# Patient Record
Sex: FEMALE | Race: WHITE | Employment: UNEMPLOYED | URBAN - METROPOLITAN AREA
[De-identification: names, ages, dates, MRNs, and addresses within clinical notes are randomized per-mention and may not be internally consistent; named-entity substitution may affect disease eponyms.]

---

## 2017-03-02 RX ORDER — ESCITALOPRAM OXALATE 10 MG/1
TABLET ORAL
Qty: 90 TAB | Refills: 1 | Status: SHIPPED | OUTPATIENT
Start: 2017-03-02 | End: 2017-08-30 | Stop reason: SDUPTHER

## 2017-05-11 ENCOUNTER — OFFICE VISIT (OUTPATIENT)
Dept: OBGYN CLINIC | Age: 42
End: 2017-05-11

## 2017-05-11 VITALS
DIASTOLIC BLOOD PRESSURE: 72 MMHG | WEIGHT: 212.6 LBS | BODY MASS INDEX: 35.42 KG/M2 | RESPIRATION RATE: 18 BRPM | SYSTOLIC BLOOD PRESSURE: 126 MMHG | HEIGHT: 65 IN

## 2017-05-11 DIAGNOSIS — Z01.419 ENCOUNTER FOR GYNECOLOGICAL EXAMINATION (GENERAL) (ROUTINE) WITHOUT ABNORMAL FINDINGS: Primary | ICD-10-CM

## 2017-05-11 DIAGNOSIS — F41.9 ANXIETY: ICD-10-CM

## 2017-05-11 DIAGNOSIS — Z92.0: ICD-10-CM

## 2017-05-11 RX ORDER — ETONOGESTREL AND ETHINYL ESTRADIOL 11.7; 2.7 MG/1; MG/1
INSERT, EXTENDED RELEASE VAGINAL
Qty: 3 DEVICE | Refills: 4 | Status: SHIPPED | OUTPATIENT
Start: 2017-05-11 | End: 2018-05-30 | Stop reason: SDUPTHER

## 2017-05-11 NOTE — PATIENT INSTRUCTIONS

## 2017-05-11 NOTE — PROGRESS NOTES
[facility]  [email or web address]  [phone]    Justyna Kimble MD, [facility]       Annual Gynecologic Exam  [facility] 40-60  Chief Complaint   Patient presents with    Well Woman       Fern Fernandez is a 40 y.o. No obstetric history on file. WHITE OR   female who presents for an annual exam.  Patient's last menstrual period was 03/27/2017 (exact date). .    She does not report additional concerns today. Menstrual status:  Her periods are moderate. She does not report dysmenorrhea/painful menses. She does not report irregular bleeding. Sexual history and Contraception:  History   Sexual Activity    Sexual activity: Yes    Partners: Male    Birth control/ protection: Inserts     She never uses condoms with sexual activity. She does not reports new sexual partner(s) in the last year. The patient does not request STD testing. Preventive Medicine History:  Her last annual GYN exam was about one year ago. Her most recent Pap smear result: normal was obtained in January 2014. Her most recent HR HPV screen was Negative obtained 3 year(s) ago. She does not have a history of KENZIE 2, 3 or cervical cancer. Breast health:  Last mammogram: was normal.   A mammogram was scheduled for today. Breast cancer family updated: see FH. Bone health: Christiano Beasley She does not have a history of osteopenia/osteoporosis. Osteoporosis family history updated: see . Past Medical History:   Diagnosis Date    ADHD (attention deficit hyperactivity disorder) 2008    adult. stopped meds 2015, doing well    Allergy to mold spores     Anxiety 3/2012    first rx age 33 5    Asthma     mild.  Breast nodule 3/2014    R 12:00.  dense tissue. Dr. 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   Dr. Jeffy Montoya Vitamin D deficiency      OB History   Obstetric Comments Menarche:  16. LMP: 1/2/14. # of Children:  0. Age at Delivery of First Child:  n/a. Hysterectomy/oophorectomy:  NO/NO. Breast Bx:  no.  Hx of Breast Feeding:  no. BCP:  yes. Hormone therapy:  no.       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     Social History Main Topics    Smoking status: Never Smoker    Smokeless tobacco: Never Used    Alcohol use Yes      Comment: 3-4 per month    Drug use: No    Sexual activity: Yes     Partners: Male     Birth control/ protection: Inserts     Other Topics Concern    Not on file     Social History Narrative       Allergies   Allergen Reactions    Wellbutrin [Bupropion Hcl] Other (comments)     irritability       Current Outpatient Prescriptions   Medication Sig    ethinyl estradiol-etonogestrel (NUVARING) 0.12-0.015 mg/24 hr vaginal ring Per month: Insert vaginally, leave in for three weeks, remove for one week    escitalopram oxalate (LEXAPRO) 10 mg tablet TAKE 1 TABLET BY MOUTH DAILY.  SELENIUM PO Take  by mouth.  cetirizine (ZYRTEC) 10 mg tablet Take 10 mg by mouth daily.  methimazole (TAPAZOLE) 5 mg tablet Take 1 Tab by mouth daily. (Patient taking differently: Take 5 mg by mouth daily. Pt. Takes 1/2 tab M, W, F)    diphenhydrAMINE (BENADRYL) 25 mg capsule Take 25 mg by mouth every six (6) hours as needed.  mometasone-formoterol (DULERA) 100-5 mcg/actuation HFA inhaler Take 2 Puffs by inhalation two (2) times a day.     albuterol (VENTOLIN HFA) 90 mcg/actuation inhaler Take 2 Puffs by inhalation every six (6) hours as needed for Wheezing.  nystatin-triamcinolone (MYCOLOG II) topical cream Apply  to affected area three (3) times daily as needed for Skin Irritation.  OMEGA-3 FATTY ACIDS/FISH OIL (OMEGA 3 FISH OIL PO) Take 2,400 mg by mouth every other day.  PRENATAL VITS W-CA,FE,FA,<1MG, (PRENATAL VITAMIN PO) Take 1 Tab by mouth daily.  cholecalciferol, VITAMIN D3, (VITAMIN D3) 5,000 unit tab tablet Take 1 Tab by mouth daily. No current facility-administered medications for this visit.         Patient Active Problem List   Diagnosis Code    Anxiety F41.9    Vitamin D deficiency E55.9    ADHD (attention deficit hyperactivity disorder) F90.9    Hyperthyroidism E05.90    Environmental allergies Z91.09    Asthma J45.909    Headache R51    Family history of ovarian cancer Z80.41    Breast lump on right side at 1 o'clock position N63    Large breasts N62       Review of Systems - History obtained from the patient  Constitutional: negative for weight loss, fever, night sweats  HEENT: negative for hearing loss, earache, congestion, snoring, sorethroat  CV: negative for chest pain, palpitations, edema  Resp: negative for cough, shortness of breath, wheezing  GI: negative for change in bowel habits, abdominal pain, black or bloody stools  : negative for frequency, dysuria, hematuria, vaginal discharge  MSK: negative for back pain, joint pain, muscle pain  Breast: negative for breast lumps, nipple discharge, galactorrhea  Skin :negative for itching, rash, hives  Neuro: negative for dizziness, headache, confusion, weakness  Psych: negative for anxiety, depression, change in mood  Heme/lymph: negative for bleeding, bruising, pallor    Physical Exam  Visit Vitals    /72 (BP 1 Location: Left arm, BP Patient Position: Sitting)    Resp 18    Ht 5' 5\" (1.651 m)    Wt 212 lb 9.6 oz (96.4 kg)    LMP 03/27/2017 (Exact Date)  BMI 35.38 kg/m2       Constitutional  · Appearance: well-nourished, well developed, alert, in no acute distress    HENT  · Head and Face: appears normal    Neck  · Inspection/Palpation: normal appearance, no masses or tenderness  · Lymph Nodes: no lymphadenopathy present  · Thyroid: gland size normal, nontender, no nodules or masses present on palpation    Chest  · Respiratory Effort: breathing labored  · Auscultation: normal breath sounds    Cardiovascular  · Heart:  · Auscultation: regular rate and rhythm without murmur    Breasts  · Inspection of Breasts: breasts symmetrical, no skin changes, no discharge present, nipple appearance normal, no skin retraction present  · Palpation of Breasts and Axillae: no masses present on palpation, no breast tenderness  · Axillary Lymph Nodes: no lymphadenopathy present    Gastrointestinal  · Abdominal Examination: abdomen non-tender to palpation, normal bowel sounds, no masses present  · Liver and spleen: no hepatomegaly present, spleen not palpable  · Hernias: no hernias identified    Genitourinary  · External Genitalia: normal appearance for age, no discharge present, no tenderness present, no inflammatory lesions present, no masses present, no atrophy present  · Vagina: normal vaginal vault without central or paravaginal defects, no discharge present, no inflammatory lesions present, no masses present, ring in place  · Bladder: non-tender to palpation  · Urethra: appears normal  · Cervix: normal   · Uterus: normal size, shape and consistency  · Adnexa: no adnexal tenderness present, no adnexal masses present  · Perineum: perineum within normal limits, no evidence of trauma, no rashes or skin lesions present  · Anus: anus within normal limits, no hemorrhoids present  · Inguinal Lymph Nodes: no lymphadenopathy present    Skin  · General Inspection: no rash, no lesions identified    Neurologic/Psychiatric  · Mental Status:  · Orientation: grossly oriented to person, place and time  · Mood and Affect: mood normal, affect appropriate    Assessment:  40 y.o. No obstetric history on file. for well woman exam  Encounter Diagnoses   Name Primary?  Encounter for gynecological examination (general) (routine) without abnormal findings Yes    H/O hormonal contraceptive     Anxiety        Plan:  The patient was counseled about diet, exercise, healthy lifestyle  We discussed current self breast exam and mammogram recommendations  We discussed current pap smear and HR HPV testing guidelines  We recommend follow up in one year for routine annual gynecologic exam or sooner if needed  We recommend follow up with a primary care physician for any chronic medical problems or non-gynecologic concerns    We discussed calcium/vitamin D/weight bearing exercise and osteoporosis prevention  Handouts were given to the patient  Discussed risks, benefits and alternatives of ring/OCP: including but not limited to dvt/pe/mi/cva/ca/gi risks. Folllow up:  [x] return for annual well woman exam in one year or sooner if she is having problems  [] follow up and ultrasound  [x] mammogram  [] 6 months  [] 3 months  [] 1 month    Orders Placed This Encounter    ethinyl estradiol-etonogestrel (NUVARING) 0.12-0.015 mg/24 hr vaginal ring       Results for orders placed or performed in visit on 05/11/17   [facility] MAMMO BI SCREENING INCL CAD    Narrative    STUDY: Bilateral digital screening mammogram    INDICATION:  Screening. COMPARISON:  2014    BREAST COMPOSITION:  There are scattered areas of fibroglandular density. FINDINGS: Bilateral digital screening mammography was performed and is  interpreted in conjunction with a computer assisted detection (CAD) system. No  suspicious masses or calcifications are identified. There has been no  significant change. Impression    IMPRESSION:  BI-RADS 1: Negative. No mammographic evidence of malignancy.     RECOMMENDATIONS:  Next screening mammogram is recommended in one year. The patient will be notified of these results.

## 2017-08-30 RX ORDER — ESCITALOPRAM OXALATE 10 MG/1
TABLET ORAL
Qty: 90 TAB | Refills: 1 | Status: SHIPPED | OUTPATIENT
Start: 2017-08-30 | End: 2018-02-28 | Stop reason: SDUPTHER

## 2017-09-12 ENCOUNTER — TELEPHONE (OUTPATIENT)
Dept: OBGYN CLINIC | Age: 42
End: 2017-09-12

## 2017-09-12 NOTE — TELEPHONE ENCOUNTER
Message left at 2:20PM      44years old patient last seen in the office on 5/11/17. [facility] pharmacy left a message regarding a 90 day prescription for the patients Nuvaring. This nurse called the pharmacy and advised that a prescription for 90 day supply had already been sent on 5/11/17. Pharmacy verbalized understanding.

## 2017-09-18 RX ORDER — ALBUTEROL SULFATE 90 UG/1
2 AEROSOL, METERED RESPIRATORY (INHALATION)
Qty: 3 INHALER | Refills: 4 | Status: SHIPPED | OUTPATIENT
Start: 2017-09-18 | End: 2017-09-21 | Stop reason: ALTCHOICE

## 2017-09-18 NOTE — TELEPHONE ENCOUNTER
Per the pharmacy she needs her  albuterol (VENTOLIN HFA) 90 mcg/actuation inhaler  prn  12/28/15  -- Mirta Morales MD        Take 2 Puffs by inhalation every six (6) hours as needed for Wheezing   Call into the [facility] at [phone]

## 2017-09-21 ENCOUNTER — TELEPHONE (OUTPATIENT)
Dept: INTERNAL MEDICINE CLINIC | Age: 42
End: 2017-09-21

## 2017-09-21 RX ORDER — ALBUTEROL SULFATE 90 UG/1
1 AEROSOL, METERED RESPIRATORY (INHALATION)
Qty: 3 INHALER | Refills: 3 | Status: SHIPPED | OUTPATIENT
Start: 2017-09-21 | End: 2019-03-23 | Stop reason: SDUPTHER

## 2017-09-21 NOTE — TELEPHONE ENCOUNTER
[facility] at [phone] call in advising Dr Rakan Wong had requested a script for the Ventolin inhalers and pts insurance will only cover the ProAir HFA Inhaler so they need a new script for the ProAir HFA 3 inhalers sent over

## 2017-10-24 ENCOUNTER — OFFICE VISIT (OUTPATIENT)
Dept: DERMATOLOGY | Facility: AMBULATORY SURGERY CENTER | Age: 42
End: 2017-10-24

## 2017-10-24 VITALS
DIASTOLIC BLOOD PRESSURE: 82 MMHG | SYSTOLIC BLOOD PRESSURE: 130 MMHG | RESPIRATION RATE: 18 BRPM | HEIGHT: 65 IN | TEMPERATURE: 98.4 F | BODY MASS INDEX: 35.82 KG/M2 | OXYGEN SATURATION: 99 % | WEIGHT: 215 LBS | HEART RATE: 77 BPM

## 2017-10-24 DIAGNOSIS — Z12.83 SCREENING FOR MALIGNANT NEOPLASM OF SKIN: ICD-10-CM

## 2017-10-24 DIAGNOSIS — D22.9 MULTIPLE BENIGN NEVI: Primary | ICD-10-CM

## 2017-10-24 DIAGNOSIS — Z80.8 FAMILY HISTORY OF MELANOMA: ICD-10-CM

## 2017-10-24 DIAGNOSIS — L82.1 SEBORRHEIC KERATOSES: ICD-10-CM

## 2017-10-24 NOTE — MR AVS SNAPSHOT
Visit Information Date & Time Provider Department Dept. Phone Encounter #  
 10/24/2017  8:00 AM Davion Hurley NP [place] [phone] Upcoming Health Maintenance Date Due Pneumococcal 19-64 Medium Risk (1 of 1 - PPSV23) 4/21/1995 INFLUENZA AGE 9 TO ADULT 8/1/2017 PAP AKA CERVICAL CYTOLOGY 1/10/2019 BREAST CANCER SCRN MAMMOGRAM 5/11/2019 DTaP/Tdap/Td series (2 - Td) 2/22/2026 Allergies as of 10/24/2017  Review Complete On: 10/24/2017 By: Christiano Serrano Severity Noted Reaction Type Reactions Wellbutrin [Bupropion Hcl] Low 11/05/2014   Intolerance Other (comments) irritability Current Immunizations  Reviewed on 9/30/2013 Name Date Influenza Vaccine 9/30/2013 Influenza Vaccine (Quad) 11/16/2015  2:05 PM  
 Tdap 2/22/2016 Not reviewed this visit Vitals BP Pulse Temp Resp Height(growth percentile) Weight(growth percentile) 130/82 (BP 1 Location: Right arm, BP Patient Position: Sitting) 77 98.4 °F (36.9 °C) (Oral) 18 5' 5\" (1.651 m) 215 lb (97.5 kg) SpO2 BMI OB Status Smoking Status 99% 35.78 kg/m2 Having regular periods Never Smoker BMI and BSA Data Body Mass Index Body Surface Area 35.78 kg/m 2 2.11 m 2 Preferred Pharmacy [facility] Phone [facility] [id number] [place] [address] [phone] Your Updated Medication List  
  
   
This list is accurate as of: 10/24/17  8:13 AM.  Always use your most recent med list.  
  
  
  
  
 albuterol 90 mcg/actuation inhaler Commonly known as:  PROAIR HFA Take 1 Puff by inhalation every six (6) hours as needed for Wheezing or Shortness of Breath. cholecalciferol (VITAMIN D3) 5,000 unit Tab tablet Commonly known as:  VITAMIN D3 Take 1 Tab by mouth daily. diphenhydrAMINE 25 mg capsule Commonly known as:  BENADRYL Take 25 mg by mouth every six (6) hours as needed. escitalopram oxalate 10 mg tablet Commonly known as:  Brett Heads TAKE 1 TABLET BY MOUTH DAILY. ethinyl estradiol-etonogestrel 0.12-0.015 mg/24 hr vaginal ring Commonly known as:  Sebastian Thomas Per month: Insert vaginally, leave in for three weeks, remove for one week  
  
 methIMAzole 5 mg tablet Commonly known as:  TAPAZOLE Take 1 Tab by mouth daily. mometasone-formoterol 100-5 mcg/actuation HFA inhaler Commonly known as:  Faraz Soles Take 2 Puffs by inhalation two (2) times a day. nystatin-triamcinolone topical cream  
Commonly known as:  MYCOLOG II Apply  to affected area three (3) times daily as needed for Skin Irritation. OMEGA 3 FISH OIL PO Take 2,400 mg by mouth every other day. PRENATAL VITAMIN PO Take 1 Tab by mouth daily. SELENIUM PO Take  by mouth. ZyrTEC 10 mg tablet Generic drug:  cetirizine Take 10 mg by mouth daily. Patient Instructions Self Skin Exam and Sunscreens Early detection and treatment is essential in the treatment of all forms of skin cancer. If caught early, all forms of skin cancer are curable. In addition to your regular visits, you should perform a monthly skin examination. Over time, you become familiar with what is normally found on your skin and can identify new or suspicious spots. One of the screening tools you can use to assess your skin is to follow the ABCDEs: 
 
A= Asymmetry (One half is unlike the other half) B= Border (An irregular, scalloped or poorly defined edge) C= Color (Is varied from one area to another, has shades of tan, brown/ black,       white, red or blue) D= Diameter (Spots larger than 6mm or a pencil eraser) E= Evolving (New spots or one that is changing in size, shape, or color) A follow- up interval will be customized based on your history of skin cancer or level of skin damage and risk factors. In any case, if you notice a suspicious or new spot, an appointment should be arranged between regular visits. Everyone should use sunscreen and sun-safe practices, which is especially important for those with a personal or family history of skin cancer. Suggestions for this include: 1. Use daily moisturizers containing SPF 30 or higher. 2. Wear long sleeve clothing with UPF ratings and a broad-brimmed hat. 3. Apply sunscreen with SPF 30 or higher to all sun exposed areas if you are going to be in the sun. A broad spectrum UVA/ UVB sunscreen is best.  Dont forget to REAPPLY every two hours or more often if swimming or sweating! 4. Avoid outside activities during peak sun hours, especially in the summer (10am- 2pm). 5. DO NOT use tanning beds. Using sunscreen and sun-safe practices can help reduce the likelihood of developing skin cancer or additional skin cancers in those previously diagnosed. . 
 
 
  
Introducing [facility]! Dear Yoselyn Perales: Thank you for requesting a [employer] account. Our records indicate that you have previously registered for a [employer]ood[employer] account but its currently inactive. Please call our [employer] support line at [phone]. Additional Information If you have questions, please visit the Frequently Asked Questions section of the [employer] website at [email or web address] Remember, [employer] is NOT to be used for urgent needs. For medical emergencies, dial 911. Now available from your iPhone and Android! Please provide this summary of care documentation to your next provider. Your primary care clinician is listed as Ritu Lin. If you have any questions after today's visit, please call [phone].

## 2017-10-24 NOTE — PATIENT INSTRUCTIONS
Self Skin Exam and Sunscreens    Early detection and treatment is essential in the treatment of all forms of skin cancer. If caught early, all forms of skin cancer are curable. In addition to your regular visits, you should perform a monthly skin examination. Over time, you become familiar with what is normally found on your skin and can identify new or suspicious spots. One of the screening tools you can use to assess your skin is to follow the ABCDEs:    A= Asymmetry (One half is unlike the other half)     B= Border (An irregular, scalloped or poorly defined edge)    C= Color (Is varied from one area to another, has shades of tan, brown/ black,       white, red or blue)    D= Diameter (Spots larger than 6mm or a pencil eraser)    E= Evolving (New spots or one that is changing in size, shape, or color)    A follow- up interval will be customized based on your history of skin cancer or level of skin damage and risk factors. In any case, if you notice a suspicious or new spot, an appointment should be arranged between regular visits. Everyone should use sunscreen and sun-safe practices, which is especially important for those with a personal or family history of skin cancer. Suggestions for this include:    1. Use daily moisturizers containing SPF 30 or higher. 2. Wear long sleeve clothing with UPF ratings and a broad-brimmed hat. 3. Apply sunscreen with SPF 30 or higher to all sun exposed areas if you are going to be in the sun. A broad spectrum UVA/ UVB sunscreen is best.  Dont forget to REAPPLY every two hours or more often if swimming or sweating! 4. Avoid outside activities during peak sun hours, especially in the summer (10am- 2pm). 5. DO NOT use tanning beds. Using sunscreen and sun-safe practices can help reduce the likelihood of developing skin cancer or additional skin cancers in those previously diagnosed. Lynda Vergara

## 2017-10-24 NOTE — PROGRESS NOTES
Chief Complaint   Patient presents with    Skin Exam     1. Have you been to the ER, urgent care clinic since your last visit? Hospitalized since your last visit? No    2. Have you seen or consulted any other health care providers outside of the [address] [place] [address] since your last visit? Include any pap smears or colon screening. Last November Martita Durham/ [facility].

## 2018-05-30 ENCOUNTER — OFFICE VISIT (OUTPATIENT)
Dept: OBGYN CLINIC | Age: 43
End: 2018-05-30

## 2018-05-30 VITALS
SYSTOLIC BLOOD PRESSURE: 126 MMHG | BODY MASS INDEX: 36.82 KG/M2 | HEIGHT: 65 IN | DIASTOLIC BLOOD PRESSURE: 90 MMHG | WEIGHT: 221 LBS

## 2018-05-30 DIAGNOSIS — Z76.89 ENCOUNTER FOR MENSTRUAL REGULATION: ICD-10-CM

## 2018-05-30 DIAGNOSIS — Z01.419 WELL WOMAN EXAM: Primary | ICD-10-CM

## 2018-05-30 DIAGNOSIS — Z92.0: ICD-10-CM

## 2018-05-30 RX ORDER — ETONOGESTREL AND ETHINYL ESTRADIOL 11.7; 2.7 MG/1; MG/1
INSERT, EXTENDED RELEASE VAGINAL
Qty: 3 DEVICE | Refills: 4 | Status: SHIPPED | OUTPATIENT
Start: 2018-05-30 | End: 2020-08-17 | Stop reason: SDUPTHER

## 2018-05-30 NOTE — MR AVS SNAPSHOT
[address] [place] [address] 
[phone] Patient: El Yeung MRN: [id number] :1975 Visit Information Date & Time Provider Department Dept. Phone Encounter #  
 2018  4:00 PM MD Steve Hines Robert [phone] Your Appointments 2018  8:45 AM  
ROUTINE CARE with Thayne Eisenmenger, MD  
[facility] [address]) Appt Note: med check [address] Dave Babinski Rubina Scales [address]  
  
   
 [address] [id number]  
  
    
 2018  1:20 PM  
MAMMOGRAPHY with MAMMOGRAM, MICHELA Jones ([address]) Appt Note: 2D only  tp pt  
 [address] [facility] [address] [place] [address]  
[phone]  
  
   
 [address]  
  
    
 10/24/2018  8:00 AM  
Office Visit with Augustine Del Castillo NP [place] [address] [place] [address]) Appt Note: 1 year skin exam  
 [facility] [address] [facility] [id number] [place], [place] [id number] [address] [address] Upcoming Health Maintenance Date Due Influenza Age 5 to Adult 2018 PAP AKA CERVICAL CYTOLOGY 1/10/2019 BREAST CANCER SCRN MAMMOGRAM 2019 Allergies as of 2018  Review Complete On: 2018 By: Gilbert Phillips MD  
  
 Severity Noted Reaction Type Reactions Wellbutrin [Bupropion Hcl] Low 2014   Intolerance Other (comments) irritability Current Immunizations  Reviewed on 2013 Name Date Influenza Vaccine 2013 Influenza Vaccine (Quad) 2015  2:05 PM  
 Tdap 2016 Not reviewed this visit You Were Diagnosed With   
  
 Codes Comments Well woman exam    -  Primary ICD-10-CM: P18.554 ICD-9-CM: V72.31   
 H/O hormonal contraceptive     ICD-10-CM: Z92.0 ICD-9-CM: V15.7 Vitals BP Height(growth percentile) Weight(growth percentile) LMP  
 126/90 (BP 1 Location: Left arm, BP Patient Position: Sitting) 5' 5\" (1.651 m) 221 lb (100.2 kg) 04/27/2018 (Within Days) BMI OB Status Smoking Status 36.78 kg/m2 Having regular periods Never Smoker Vitals History BMI and BSA Data Body Mass Index Body Surface Area 36.78 kg/m 2 2.14 m 2 Preferred Pharmacy Pharmacy Name Phone [facility] [address] [place] [address] [phone] Your Updated Medication List  
  
   
This list is accurate as of 5/30/18  5:08 PM.  Always use your most recent med list.  
  
  
  
  
 albuterol 90 mcg/actuation inhaler Commonly known as:  PROAIR HFA Take 1 Puff by inhalation every six (6) hours as needed for Wheezing or Shortness of Breath. cholecalciferol (VITAMIN D3) 5,000 unit Tab tablet Commonly known as:  VITAMIN D3 Take 1 Tab by mouth daily. diphenhydrAMINE 25 mg capsule Commonly known as:  BENADRYL Take 25 mg by mouth every six (6) hours as needed. DULERA 100-5 mcg/actuation HFA inhaler Generic drug:  mometasone-formoterol INHALE 2 PUFFS BY MOUTH TWICE DAILY  
  
 escitalopram oxalate 10 mg tablet Commonly known as:  Lidia Bread TAKE 1 TABLET BY MOUTH DAILY. ethinyl estradiol-etonogestrel 0.12-0.015 mg/24 hr vaginal ring Commonly known as:  Leyla Cooley Per month: Insert vaginally, leave in for three weeks, remove for one week  
  
 methIMAzole 5 mg tablet Commonly known as:  TAPAZOLE Take 1 Tab by mouth daily. multivitamin, tx-iron-ca-min 9 mg iron-400 mcg Tab tablet Commonly known as:  THERA-M w/ IRON Take 1 Tab by mouth daily. nystatin-triamcinolone topical cream  
Commonly known as:  MYCOLOG II Apply  to affected area three (3) times daily as needed for Skin Irritation.   
  
 OMEGA 3 FISH OIL PO  
 Take 2,400 mg by mouth every other day. PRENATAL VITAMIN PO Take 1 Tab by mouth daily. SELENIUM PO Take  by mouth. ZyrTEC 10 mg tablet Generic drug:  cetirizine Take 10 mg by mouth daily. Prescriptions Sent to Pharmacy Refills  
 ethinyl estradiol-etonogestrel (NUVARING) 0.12-0.015 mg/24 hr vaginal ring 4 Sig: Per month: Insert vaginally, leave in for three weeks, remove for one week Class: Normal  
 Pharmacy: [facility] [address] #: [phone] Patient Instructions Well Visit, Ages 25 to 48: Care Instructions Your Care Instructions Physical exams can help you stay healthy. Your doctor has checked your overall health and may have suggested ways to take good care of yourself. He or she also may have recommended tests. At home, you can help prevent illness with healthy eating, regular exercise, and other steps. Follow-up care is a key part of your treatment and safety. Be sure to make and go to all appointments, and call your doctor if you are having problems. It's also a good idea to know your test results and keep a list of the medicines you take. How can you care for yourself at home? · Reach and stay at a healthy weight. This will lower your risk for many problems, such as obesity, diabetes, heart disease, and high blood pressure. · Get at least 30 minutes of physical activity on most days of the week. Walking is a good choice. You also may want to do other activities, such as running, swimming, cycling, or playing tennis or team sports. Discuss any changes in your exercise program with your doctor. · Do not smoke or allow others to smoke around you. If you need help quitting, talk to your doctor about stop-smoking programs and medicines. These can increase your chances of quitting for good.  
· Talk to your doctor about whether you have any risk factors for sexually transmitted infections (STIs). Having one sex partner (who does not have STIs and does not have sex with anyone else) is a good way to avoid these infections. · Use birth control if you do not want to have children at this time. Talk with your doctor about the choices available and what might be best for you. · Protect your skin from too much sun. When you're outdoors from 10 a.m. to 4 p.m., stay in the shade or cover up with clothing and a hat with a wide brim. Wear sunglasses that block UV rays. Even when it's cloudy, put broad-spectrum sunscreen (SPF 30 or higher) on any exposed skin. · See a dentist one or two times a year for checkups and to have your teeth cleaned. · Wear a seat belt in the car. · Drink alcohol in moderation, if at all. That means no more than 2 drinks a day for men and 1 drink a day for women. Follow your doctor's advice about when to have certain tests. These tests can spot problems early. For everyone · Cholesterol. Have the fat (cholesterol) in your blood tested after age 21. Your doctor will tell you how often to have this done based on your age, family history, or other things that can increase your risk for heart disease. · Blood pressure. Have your blood pressure checked during a routine doctor visit. Your doctor will tell you how often to check your blood pressure based on your age, your blood pressure results, and other factors. · Vision. Talk with your doctor about how often to have a glaucoma test. 
· Diabetes. Ask your doctor whether you should have tests for diabetes. · Colon cancer. Have a test for colon cancer at age 48. You may have one of several tests. If you are younger than 48, you may need a test earlier if you have any risk factors. Risk factors include whether you already had a precancerous polyp removed from your colon or whether your parent, brother, sister, or child has had colon cancer. For women · Breast exam and mammogram. Talk to your doctor about when you should have a clinical breast exam and a mammogram. Medical experts differ on whether and how often women under 50 should have these tests. Your doctor can help you decide what is right for you. · Pap test and pelvic exam. Begin Pap tests at age 24. A Pap test is the best way to find cervical cancer. The test often is part of a pelvic exam. Ask how often to have this test. 
· Tests for sexually transmitted infections (STIs). Ask whether you should have tests for STIs. You may be at risk if you have sex with more than one person, especially if your partners do not wear condoms. For men · Tests for sexually transmitted infections (STIs). Ask whether you should have tests for STIs. You may be at risk if you have sex with more than one person, especially if you do not wear a condom. · Testicular cancer exam. Ask your doctor whether you should check your testicles regularly. · Prostate exam. Talk to your doctor about whether you should have a blood test (called a PSA test) for prostate cancer. Experts differ on whether and when men should have this test. Some experts suggest it if you are older than 39 and are -American or have a father or brother who got prostate cancer when he was younger than 72. When should you call for help? Watch closely for changes in your health, and be sure to contact your doctor if you have any problems or symptoms that concern you. Where can you learn more? Go to http://em-yenny.info/. Enter P072 in the search box to learn more about \"Well Visit, Ages 25 to 48: Care Instructions. \" Current as of: May 12, 2017 Content Version: 11.4 © 3706-6221 Healthwise, Incorporated. Care instructions adapted under license by [employer] (which disclaims liability or warranty for this information).  If you have questions about a medical condition or this instruction, always ask your healthcare professional. [address] any warranty or liability for your use of this information. Introducing [facility]! Dear Morris: Thank you for requesting a Intrakr account. Our records indicate that you have previously registered for a Intrakr account but its currently inactive. Please call our Intrakr support line at [phone]. Additional Information If you have questions, please visit the Frequently Asked Questions section of the Intrakr website at [email or web address] Remember, Intrakr is NOT to be used for urgent needs. For medical emergencies, dial 911. Now available from your iPhone and Android! Please provide this summary of care documentation to your next provider. Your primary care clinician is listed as Deirdre Browning. If you have any questions after today's visit, please call [phone].

## 2018-05-30 NOTE — PATIENT INSTRUCTIONS

## 2018-05-30 NOTE — PROGRESS NOTES
[facility]  [email or web address]  [phone]    Arlen Hicks MD, [address] [facility]       Annual Gynecologic Exam  [facility] 40-60  Chief Complaint   Patient presents with    Well Woman       Savanna Ba Rash is a 44 y.o. No obstetric history on file. WHITE OR   female who presents for an annual exam.  Patient's last menstrual period was 04/27/2018 (within days). .    She does not report additional concerns today. Menstrual status:  Her periods are moderate, regular cycles. She does report dysmenorrhea/painful menses. Patient reports significant low back pain and headaches associated with menses. She does not report irregular bleeding. Happy with nuvaring. Sexual history and Contraception:  History   Sexual Activity    Sexual activity: Yes    Partners: Male    Birth control/ protection: Inserts       She does not reports new sexual partner(s) in the last year. The patient does not request STD testing. Preventive Medicine History:  Her most recent Pap smear result: normal was obtained in January 2014    Her most recent HR HPV screen was Negative obtained in 2014    She does not have a history of KENZIE 2, 3 or cervical cancer. Breast health:  Last mammogram: approximate date 5/11/2017 and was normal.   A mammogram was not scheduled for today. Breast cancer family updated: see FH. Bone health: Amy Jones She does not have a history of osteopenia/osteoporosis. Osteoporosis family history updated: see FH. Past Medical History:   Diagnosis Date    ADHD (attention deficit hyperactivity disorder) 2008    adult. stopped meds 2015, doing well    Allergy to mold spores     Anxiety 3/2012    first rx age 33 5    Asthma     mild.  Breast nodule 3/2014    R 12:00.  dense tissue. Dr. 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   Dr. Danial Gustafson H/O mammogram 05/11/2017    Negative    Vitamin D deficiency      OB History   Obstetric Comments   Menarche:  16. LMP: 1/2/14. # of Children:  0. Age at Delivery of First Child:  n/a. Hysterectomy/oophorectomy:  NO/NO. Breast Bx:  no.  Hx of Breast Feeding:  no. BCP:  yes. Hormone therapy:  no.       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     Social History Main Topics    Smoking status: Never Smoker    Smokeless tobacco: Never Used    Alcohol use Yes      Comment: 3-4 per month    Drug use: No    Sexual activity: Yes     Partners: Male     Birth control/ protection: Inserts     Other Topics Concern    Not on file     Social History Narrative       Allergies   Allergen Reactions    Wellbutrin [Bupropion Hcl] Other (comments)     irritability       Current Outpatient Prescriptions   Medication Sig    multivitamin, tx-iron-ca-min (THERA-M W/ IRON) 9 mg iron-400 mcg tab tablet Take 1 Tab by mouth daily.  ethinyl estradiol-etonogestrel (NUVARING) 0.12-0.015 mg/24 hr vaginal ring Per month: Insert vaginally, leave in for three weeks, remove for one week    escitalopram oxalate (LEXAPRO) 10 mg tablet TAKE 1 TABLET BY MOUTH DAILY.  DULERA 100-5 mcg/actuation HFA inhaler INHALE 2 PUFFS BY MOUTH TWICE DAILY    albuterol (PROAIR HFA) 90 mcg/actuation inhaler Take 1 Puff by inhalation every six (6) hours as needed for Wheezing or Shortness of Breath.     diphenhydrAMINE (BENADRYL) 25 mg capsule Take 25 mg by mouth every six (6) hours as needed.  cholecalciferol, VITAMIN D3, (VITAMIN D3) 5,000 unit tab tablet Take 1 Tab by mouth daily.  cetirizine (ZYRTEC) 10 mg tablet Take 10 mg by mouth daily.  methimazole (TAPAZOLE) 5 mg tablet Take 1 Tab by mouth daily. (Patient taking differently: Take 5 mg by mouth daily. Pt. Takes 1/2 tab M, W, F)     No current facility-administered medications for this visit.         Patient Active Problem List   Diagnosis Code    Anxiety F41.9    Vitamin D deficiency E55.9    ADHD (attention deficit hyperactivity disorder) F90.9    Hyperthyroidism E05.90    Environmental allergies Z91.09    Asthma J45.909    Headache(784.0) R51    Family history of ovarian cancer Z80.41    Breast lump on right side at 1 o'clock position N63.12    Large breasts N62       Review of Systems - History obtained from the patient  Constitutional: negative for weight loss, fever, night sweats  HEENT: negative for hearing loss, earache, congestion, snoring, sorethroat  CV: negative for chest pain, palpitations, edema  Resp: negative for cough, shortness of breath, wheezing  GI: negative for change in bowel habits, abdominal pain, black or bloody stools  : negative for frequency, dysuria, hematuria, vaginal discharge  MSK: negative for back pain, joint pain, muscle pain  Breast: negative for breast lumps, nipple discharge, galactorrhea  Skin :negative for itching, rash, hives  Neuro: negative for dizziness, headache, confusion, weakness  Psych: negative for anxiety, depression, change in mood  Heme/lymph: negative for bleeding, bruising, pallor    Physical Exam  Visit Vitals    /90 (BP 1 Location: Left arm, BP Patient Position: Sitting)    Ht 5' 5\" (1.651 m)    Wt 221 lb (100.2 kg)    LMP 04/27/2018 (Within Days)    BMI 36.78 kg/m2       Constitutional  · Appearance: well-nourished, well developed, alert, in no acute distress    HENT  · Head and Face: appears normal    Neck  · Inspection/Palpation: normal appearance, no masses or tenderness  · Lymph Nodes: no lymphadenopathy present  · Thyroid: gland size normal, nontender, no nodules or masses present on palpation    Chest  · Respiratory Effort: breathing unlabored  · Auscultation: normal breath sounds    Cardiovascular  · Heart:  · Auscultation: regular rate and rhythm without murmur    Breasts  · Inspection of Breasts: breasts symmetrical, no skin changes, no discharge present, nipple appearance normal, no skin retraction present  · Palpation of Breasts and Axillae: no masses present on palpation, no breast tenderness  · Axillary Lymph Nodes: no lymphadenopathy present    Gastrointestinal  · Abdominal Examination: abdomen non-tender to palpation, normal bowel sounds, no masses present  · Liver and spleen: no hepatomegaly present, spleen not palpable  · Hernias: no hernias identified    Genitourinary  · External Genitalia: normal appearance for age, no discharge present, no tenderness present, no inflammatory lesions present, no masses present, without atrophy present  · Vagina: normal vaginal vault without central or paravaginal defects, no discharge present, no inflammatory lesions present, no masses present  · Bladder: non-tender to palpation  · Urethra: appears normal  · Cervix: normal   · Uterus: normal size, shape and consistency  · Adnexa: no adnexal tenderness present, no adnexal masses present  · Perineum: perineum within normal limits, no evidence of trauma, no rashes or skin lesions present  · Anus: anus within normal limits, no hemorrhoids present  · Inguinal Lymph Nodes: no lymphadenopathy present    Skin  · General Inspection: no rash, no lesions identified    Neurologic/Psychiatric  · Mental Status:  · Orientation: grossly oriented to person, place and time  · Mood and Affect: mood normal, affect appropriate    Assessment:  44 y.o. No obstetric history on file.  for well woman exam  Encounter Diagnoses Name Primary?  Well woman exam Yes    H/O hormonal contraceptive     Encounter for menstrual regulation        Plan:  The patient was counseled about diet, exercise, healthy lifestyle  We discussed current self breast exam and mammogram recommendations  We discussed current pap smear and HR HPV testing guidelines  We recommend follow up in one year for routine annual gynecologic exam or sooner if needed  We recommend follow up with a primary care physician for any chronic medical problems or non-gynecologic concerns    We discussed calcium/vitamin D/weight bearing exercise and osteoporosis prevention  Handouts were given to the patient    Discussed risks, benefits and alternatives of OCP/nuvaring/patch: including but not limited to dvt/pe/mi/cva/ca/gi risks. Folllow up:  [x] return for annual well woman exam in one year or sooner if she is having problems  [] follow up and ultrasound  [x] mammogram  [] 6 months  [] 3 months  [] 1 month    Orders Placed This Encounter    ethinyl estradiol-etonogestrel (NUVARING) 0.12-0.015 mg/24 hr vaginal ring       No results found for any visits on 05/30/18.

## 2018-06-20 ENCOUNTER — OFFICE VISIT (OUTPATIENT)
Dept: INTERNAL MEDICINE CLINIC | Age: 43
End: 2018-06-20

## 2018-06-20 VITALS
DIASTOLIC BLOOD PRESSURE: 83 MMHG | HEART RATE: 82 BPM | BODY MASS INDEX: 37.49 KG/M2 | HEIGHT: 65 IN | WEIGHT: 225 LBS | OXYGEN SATURATION: 97 % | TEMPERATURE: 98.7 F | RESPIRATION RATE: 18 BRPM | SYSTOLIC BLOOD PRESSURE: 132 MMHG

## 2018-06-20 DIAGNOSIS — R51.9 MORNING HEADACHE: ICD-10-CM

## 2018-06-20 DIAGNOSIS — E66.01 SEVERE OBESITY (BMI 35.0-39.9): ICD-10-CM

## 2018-06-20 DIAGNOSIS — R06.83 SNORING: ICD-10-CM

## 2018-06-20 DIAGNOSIS — F90.2 ATTENTION DEFICIT HYPERACTIVITY DISORDER (ADHD), COMBINED TYPE: Primary | ICD-10-CM

## 2018-06-20 RX ORDER — DEXTROAMPHETAMINE SACCHARATE, AMPHETAMINE ASPARTATE MONOHYDRATE, DEXTROAMPHETAMINE SULFATE AND AMPHETAMINE SULFATE 2.5; 2.5; 2.5; 2.5 MG/1; MG/1; MG/1; MG/1
10 CAPSULE, EXTENDED RELEASE ORAL
Qty: 90 CAP | Refills: 0 | Status: SHIPPED | OUTPATIENT
Start: 2018-06-20 | End: 2019-01-03 | Stop reason: SDUPTHER

## 2018-06-20 NOTE — MR AVS SNAPSHOT
[facility] 
 
 
 [address] [place] [address] 
[phone] Patient: Lashaun Leal MRN: [id number] :1975 Visit Information Date & Time Provider Department Dept. Phone Encounter #  
 2018  8:45 AM Suni Campos MD [facility] [address] [id number] Your Appointments 2018  1:20 PM  
MAMMOGRAPHY with MAMMOGRAM, MICHELA Jones ([address]) Appt Note: 2D only  tp pt  
 [address] [facility] [address] [facility] [address]  
[phone]  
  
   
 [address] [address]  
  
    
 10/24/2018  8:00 AM  
Office Visit with NELLIE Martinez [address] [facility]) Appt Note: 1 year skin exam  
 [facility] [address]  
[facility] [address] [facility] [address] Upcoming Health Maintenance Date Due Pneumococcal 19-64 Medium Risk (1 of 1 - PPSV23) 1995 Influenza Age 5 to Adult 2018 PAP AKA CERVICAL CYTOLOGY 1/10/2019 BREAST CANCER SCRN MAMMOGRAM 2019 DTaP/Tdap/Td series (2 - Td) 2026 Allergies as of 2018  Review Complete On: 2018 By: Suni Campos MD  
  
 Severity Noted Reaction Type Reactions Wellbutrin [Bupropion Hcl] Low 2014   Intolerance Other (comments) irritability Current Immunizations  Reviewed on 2013 Name Date Influenza Vaccine 2013 Influenza Vaccine (Quad) 2015  2:05 PM  
 Tdap 2016 Not reviewed this visit You Were Diagnosed With   
  
 Codes Comments Attention deficit hyperactivity disorder (ADHD), combined type    -  Primary ICD-10-CM: F90.2 ICD-9-CM: 314.01 Snoring     ICD-10-CM: R06.83 
ICD-9-CM: 786.09  Severe obesity (BMI 35.0-39.9) (HCC)     ICD-10-CM: E66.01 
ICD-9-CM: 278.01   
 Morning headache     ICD-10-CM: R51 ICD-9-CM: 103. 0 Vitals BP Pulse Temp Resp Height(growth percentile) Weight(growth percentile) 132/83 (BP 1 Location: Left arm, BP Patient Position: Sitting) 82 98.7 °F (37.1 °C) (Oral) 18 5' 5\" (1.651 m) 225 lb (102.1 kg) LMP SpO2 BMI OB Status Smoking Status 05/27/2018 (Within Days) 97% 37.44 kg/m2 Having regular periods Never Smoker Vitals History BMI and BSA Data Body Mass Index Body Surface Area  
 37.44 kg/m 2 2.16 m 2 Preferred [facility]acy [facility] [address] [place] [address] [phone] Your Updated Medication List  
  
   
This list is accurate as of 6/20/18  9:43 AM.  Always use your most recent med list.  
  
  
  
  
 albuterol 90 mcg/actuation inhaler Commonly known as:  PROAIR HFA Take 1 Puff by inhalation every six (6) hours as needed for Wheezing or Shortness of Breath. amphetamine-dextroamphetamine XR 10 mg XR capsule Commonly known as:  ADDERALL XR Take 1 Cap (10 mg total) by mouth every morning. 90 day supply. cholecalciferol (VITAMIN D3) 5,000 unit Tab tablet Commonly known as:  VITAMIN D3 Take 1 Tab by mouth daily. diphenhydrAMINE 25 mg capsule Commonly known as:  BENADRYL Take 25 mg by mouth every six (6) hours as needed. escitalopram oxalate 10 mg tablet Commonly known as:  Skylar Li TAKE 1 TABLET BY MOUTH DAILY. ethinyl estradiol-etonogestrel 0.12-0.015 mg/24 hr vaginal ring Commonly known as:  Zehra Chain Per month: Insert vaginally, leave in for three weeks, remove for one week  
  
 methIMAzole 5 mg tablet Commonly known as:  TAPAZOLE Take 1 Tab by mouth daily. multivitamin, tx-iron-ca-min 9 mg iron-400 mcg Tab tablet Commonly known as:  THERA-M w/ IRON Take 1 Tab by mouth daily. ZyrTEC 10 mg tablet Generic drug:  cetirizine Take 10 mg by mouth daily. Prescriptions Printed Refills  
 amphetamine-dextroamphetamine XR (ADDERALL XR) 10 mg XR capsule 0 Sig: Take 1 Cap (10 mg total) by mouth every morning. 90 day supply. Class: Print Route: Oral  
  
We Performed the Following REFERRAL TO SLEEP STUDIES [REF99 Custom] Referral Information Referral ID Referred By Referred To  
  
 [id number] Cesar YEAGER [facility] [address] [place] [address] Genny Denson 33 Visits Status Start Date End Date 1 New Request 6/20/18 6/20/19 If your referral has a status of pending review or denied, additional information will be sent to support the outcome of this decision. Introducing [facility] & HEALTH SERVICES! [employer] introduces Tocagen patient portal. Now you can access parts of your medical record, email your doctor's office, and request medication refills online. 1. In your internet browser, go to [email or web address] 2. Click on the First Time User? Click Here link in the Sign In box. You will see the New Member Sign Up page. 3. Enter your Tocagen Access Code exactly as it appears below. You will not need to use this code after youve completed the sign-up process. If you do not sign up before the expiration date, you must request a new code. · Tocagen Access Code: [facility] Expires: 8/28/2018  5:08 PM 
 
4. Enter the last four digits of your Social Security Number (xxxx) and Date of Birth (mm/dd/yyyy) as indicated and click Submit. You will be taken to the next sign-up page. 5. Create a Shobutt Babiest ID. This will be your Tocagen login ID and cannot be changed, so think of one that is secure and easy to remember. 6. Create a Shobutt Babiest password. You can change your password at any time. 7. Enter your Password Reset Question and Answer. This can be used at a later time if you forget your password. 8. Enter your e-mail address.  You will receive e-mail notification when new information is available in Get Together. 9. Click Sign Up. You can now view and download portions of your medical record. 10. Click the Download Summary menu link to download a portable copy of your medical information. If you have questions, please visit the Frequently Asked Questions section of the Get Together website. Remember, Get Together is NOT to be used for urgent needs. For medical emergencies, dial 911. Now available from your iPhone and Android! Please provide this summary of care documentation to your next provider. Your primary care clinician is listed as Cintia Saldana. If you have any questions after today's visit, please call [phone].

## 2018-06-20 NOTE — PROGRESS NOTES
HISTORY OF PRESENT ILLNESS    Chief Complaint   Patient presents with    Medication Refill    Medication Evaluation     Requesting Adderal       Presents for follow-up    Seeing endo for thyroid. Decreased methimazole. Reports heavy snoring. Waking snoring. Has AM headaches at times. Reports fatigue some, decreased concentration.  has WAGNER, sees Dr. Melania Lopez. ADD- stopped adderall XR 10 mg daily in 5/2015 and was doing well until the past few months. Feels scattered in thought process. Anxiety-   She weaned off lexapro spring 2014.  She re-started it again 9/2014. Trial of wellbutrin 7/2014 caused her to be more irritable so she stopped it 9/2014.     issues w lower motivation, occasional irritability at times are stable  She took zoloft, paxil in the past and they both caused lethargy and lower libido    In the past, saw Manfred Encinas, [facility] for counseling.      Review of Systems   All other systems reviewed and are negative, except as noted in HPI    Past Medical and Surgical History   has a past medical history of ADHD (attention deficit hyperactivity disorder) (2008); Allergy to mold spores; Anxiety (3/2012); Asthma; Breast nodule (3/2014); Environmental allergies; Family history of skin cancer; Graves disease (2012); H/O mammogram (05/11/2017); and Vitamin D deficiency. She also has no past medical history of Arsenic suspected exposure; Radiation exposure; Skin cancer; Sun-damaged skin; Sunburn, blistering; or Tanning bed exposure. has a past surgical history that includes hx knee arthroscopy (2003). reports that she has never smoked. She has never used smokeless tobacco. She reports that she drinks alcohol. She reports that she does not use illicit drugs. family history includes COPD in her mother; Cancer in her mother; Diabetes in her father and mother; Hypertension in her father; Stroke in her father; Thyroid Disease in her sister.     Physical Exam   Nursing note and vitals reviewed. Blood pressure 132/83, pulse 82, temperature 98.7 °F (37.1 °C), temperature source Oral, resp. rate 18, height 5' 5\" (1.651 m), weight 225 lb (102.1 kg), last menstrual period 05/27/2018, SpO2 97 %. Constitutional:  No distress. Eyes: Conjunctivae are normal.   Ears:  Hearing grossly intact  Cardiovascular: Normal rate. regular rhythm, no murmurs or gallops  No edema  Pulmonary/Chest: Effort normal.   CTAB  Musculoskeletal: moves all 4 extremities   Neurological: Alert and oriented to person, place, and time. Skin: No rash noted. Psychiatric: Normal mood and affect. Behavior is normal.   Mildly anxious, pressured    ASSESSMENT and PLAN  Diagnoses and all orders for this visit:    1. Attention deficit hyperactivity disorder (ADHD), combined type  Uncontrolled off meds for 5 years  Resume past dose  Sleep apnea if dx could contribute to s/s  Anxiety borderline controlled on lexapro  -     amphetamine-dextroamphetamine XR (ADDERALL XR) 10 mg XR capsule; Take 1 Cap (10 mg total) by mouth every morning. 90 day supply. 2. Snoring  3. Severe obesity (BMI 35.0-39.9) ([facility] [address]  4. Morning headache  Evaluate for WAGNER evaluation  -     REFERRAL TO SLEEP STUDIES    lab results and schedule of future lab studies reviewed with patient  reviewed medications and side effects in detail  Return to clinic for further evaluation if new symptoms develop      Current Outpatient Prescriptions   Medication Sig    multivitamin, tx-iron-ca-min (THERA-M W/ IRON) 9 mg iron-400 mcg tab tablet Take 1 Tab by mouth daily.  ethinyl estradiol-etonogestrel (NUVARING) 0.12-0.015 mg/24 hr vaginal ring Per month: Insert vaginally, leave in for three weeks, remove for one week    escitalopram oxalate (LEXAPRO) 10 mg tablet TAKE 1 TABLET BY MOUTH DAILY.  albuterol (PROAIR HFA) 90 mcg/actuation inhaler Take 1 Puff by inhalation every six (6) hours as needed for Wheezing or Shortness of Breath.     diphenhydrAMINE (BENADRYL) 25 mg capsule Take 25 mg by mouth every six (6) hours as needed.  cholecalciferol, VITAMIN D3, (VITAMIN D3) 5,000 unit tab tablet Take 1 Tab by mouth daily.  cetirizine (ZYRTEC) 10 mg tablet Take 10 mg by mouth daily.  methimazole (TAPAZOLE) 5 mg tablet Take 1 Tab by mouth daily. (Patient taking differently: Take 2.5 mg by mouth daily. Daily)    DULERA 100-5 mcg/actuation HFA inhaler INHALE 2 PUFFS BY MOUTH TWICE DAILY     No current facility-administered medications for this visit.

## 2018-07-31 ENCOUNTER — APPOINTMENT (OUTPATIENT)
Dept: OBGYN CLINIC | Age: 43
End: 2018-07-31

## 2018-08-21 RX ORDER — ESCITALOPRAM OXALATE 10 MG/1
TABLET ORAL
Qty: 90 TAB | Refills: 1 | Status: SHIPPED | OUTPATIENT
Start: 2018-08-21 | End: 2019-02-19 | Stop reason: SDUPTHER

## 2018-09-20 ENCOUNTER — OFFICE VISIT (OUTPATIENT)
Dept: INTERNAL MEDICINE CLINIC | Age: 43
End: 2018-09-20

## 2018-09-20 VITALS
TEMPERATURE: 98 F | OXYGEN SATURATION: 97 % | WEIGHT: 226 LBS | HEIGHT: 65 IN | DIASTOLIC BLOOD PRESSURE: 80 MMHG | HEART RATE: 77 BPM | BODY MASS INDEX: 37.65 KG/M2 | RESPIRATION RATE: 18 BRPM | SYSTOLIC BLOOD PRESSURE: 116 MMHG

## 2018-09-20 DIAGNOSIS — F90.2 ATTENTION DEFICIT HYPERACTIVITY DISORDER (ADHD), COMBINED TYPE: Primary | ICD-10-CM

## 2018-09-20 DIAGNOSIS — E05.90 HYPERTHYROIDISM: ICD-10-CM

## 2018-09-20 DIAGNOSIS — Z23 ENCOUNTER FOR IMMUNIZATION: ICD-10-CM

## 2018-09-20 RX ORDER — DEXTROAMPHETAMINE SACCHARATE, AMPHETAMINE ASPARTATE MONOHYDRATE, DEXTROAMPHETAMINE SULFATE AND AMPHETAMINE SULFATE 2.5; 2.5; 2.5; 2.5 MG/1; MG/1; MG/1; MG/1
10 CAPSULE, EXTENDED RELEASE ORAL
Qty: 90 CAP | Refills: 0 | Status: CANCELLED | OUTPATIENT
Start: 2018-09-20

## 2018-09-20 NOTE — PROGRESS NOTES
HISTORY OF PRESENT ILLNESS    Chief Complaint   Patient presents with    Behavioral Problem     medication f/u       Presents for follow-up    ADD - she filled, but did not start adderall since she was concerned that it would affect her sleep study. Still reports ADD s/s. Taking lexapro 10 mg. It has been stressful since parent in [place] w latesha    Saw Dr. Tsering Arce for home sleep study and was dx w mild WAGNER. Referred for oral appliance. She is concerned about weight. Is working [employer] Readings from Last 3 Encounters:   09/20/18 226 lb (102.5 kg)   06/20/18 225 lb (102.1 kg)   05/30/18 221 lb (100.2 kg)     Seeing Dr. Hector Noel for thyroid. appt in the past 6 months. Review of Systems   All other systems reviewed and are negative, except as noted in HPI    Past Medical and Surgical History   has a past medical history of ADHD (attention deficit hyperactivity disorder) (2008); Allergy to mold spores; Anxiety (3/2012); Asthma; Breast nodule (3/2014); Environmental allergies; Family history of skin cancer; Graves disease (2012); H/O mammogram (05/11/2017; 7/31/18); WAGNER (obstructive sleep apnea); and Vitamin D deficiency. She also has no past medical history of Arsenic suspected exposure; Radiation exposure; Skin cancer; Sun-damaged skin; Sunburn, blistering; or Tanning bed exposure. has a past surgical history that includes hx knee arthroscopy (2003). reports that she has never smoked. She has never used smokeless tobacco. She reports that she drinks alcohol. She reports that she does not use illicit drugs. family history includes COPD in her mother; Cancer in her mother; Diabetes in her father and mother; Hypertension in her father; Stroke in her father; Thyroid Disease in her sister. Physical Exam   Nursing note and vitals reviewed. Blood pressure 116/80, pulse 77, temperature 98 °F (36.7 °C), temperature source Oral, resp.  rate 18, height 5' 5\" (1.651 m), weight 226 lb (102.5 kg), last menstrual period 09/14/2018, SpO2 97 %. Constitutional:  No distress. Eyes: Conjunctivae are normal.   Ears:  Hearing grossly intact  Cardiovascular: Normal rate. regular rhythm, no murmurs or gallops  No edema  Pulmonary/Chest: Effort normal.   CTAB  Musculoskeletal: moves all 4 extremities   Neurological: Alert and oriented to person, place, and time. Skin: No rash noted. Psychiatric: Normal mood and affect. Behavior is normal.     ASSESSMENT and PLAN  Diagnoses and all orders for this visit:    1. Attention deficit hyperactivity disorder (ADHD), combined type- uncontrolled. Try adderall XR  -     amphetamine-dextroamphetamine XR (ADDERALL XR) 10 mg XR capsule; Take 1 Cap (10 mg total) by mouth every morning. 90 day supply. 2. Hyperthyroidism- controlled per endo    3. Encounter for immunization- asthma.    -     Pneumococcal polysaccharide vaccine, 23-valent, adult or immunosuppressed pt dose  -     OH IMMUNIZ ADMIN,1 SINGLE/COMB VAC/TOXO    lab results and schedule of future lab studies reviewed with patient  reviewed medications and side effects in detail  Return to clinic for further evaluation if new symptoms develop    Current Outpatient Prescriptions   Medication Sig    escitalopram oxalate (LEXAPRO) 10 mg tablet TAKE 1 TABLET BY MOUTH DAILY.  amphetamine-dextroamphetamine XR (ADDERALL XR) 10 mg XR capsule Take 1 Cap (10 mg total) by mouth every morning. 90 day supply.  multivitamin, tx-iron-ca-min (THERA-M W/ IRON) 9 mg iron-400 mcg tab tablet Take 1 Tab by mouth daily.  ethinyl estradiol-etonogestrel (NUVARING) 0.12-0.015 mg/24 hr vaginal ring Per month: Insert vaginally, leave in for three weeks, remove for one week    albuterol (PROAIR HFA) 90 mcg/actuation inhaler Take 1 Puff by inhalation every six (6) hours as needed for Wheezing or Shortness of Breath.     diphenhydrAMINE (BENADRYL) 25 mg capsule Take 25 mg by mouth every six (6) hours as needed.  cholecalciferol, VITAMIN D3, (VITAMIN D3) 5,000 unit tab tablet Take 1 Tab by mouth daily.  cetirizine (ZYRTEC) 10 mg tablet Take 10 mg by mouth daily.  methimazole (TAPAZOLE) 5 mg tablet Take 1 Tab by mouth daily. (Patient taking differently: Take 2.5 mg by mouth daily. Daily)     No current facility-administered medications for this visit.

## 2018-09-20 NOTE — MR AVS SNAPSHOT
[address] [facility] 
 
 
 [address] 
[phone] Patient: Xavier Alcala MRN: [id number] :1975 Visit Information Date & Time Provider Department Dept. Phone Encounter #  
 2018  8:15 AM Rk Rodríguez MD [facility] [address] [facility] [id number] Your Appointments 10/24/2018  8:00 AM  
Office Visit with NELLIE Lin [facility] [facility]: 1 year skin exam  
 [facility] [address] [facility] [address]  
[address] [facility] [address] [facility] [place] [address] Upcoming Health Maintenance Date Due Pneumococcal 19-64 Medium Risk (1 of 1 - PPSV23) 1995 PAP AKA CERVICAL CYTOLOGY 1/10/2019 Influenza Age 5 to Adult 3/29/2019* BREAST CANCER SCRN MAMMOGRAM 2020 DTaP/Tdap/Td series (2 - Td) 2026 *Topic was postponed. The date shown is not the original due date. Allergies as of 2018  Review Complete On: 2018 By: Rk Rodríguez MD  
  
 Severity Noted Reaction Type Reactions Wellbutrin [Bupropion Hcl] Low 2014   Intolerance Other (comments) irritability Current Immunizations  Reviewed on 2018 Name Date Influenza Vaccine 2013 Influenza Vaccine (Quad) 2015  2:05 PM  
 Pneumococcal Polysaccharide (PPSV-23)  Incomplete Tdap 2016 Reviewed by Rk Rodríguez MD on 2018 at  8:42 AM  
 Reviewed by Rk Rodríguez MD on 2018 at  8:42 AM  
You Were Diagnosed With   
  
 Codes Comments Attention deficit hyperactivity disorder (ADHD), combined type    -  Primary ICD-10-CM: F90.2 ICD-9-CM: 314.01 Hyperthyroidism     ICD-10-CM: E05.90 ICD-9-CM: 242.90 Encounter for immunization     ICD-10-CM: J02 ICD-9-CM: V03.89 Vitals BP Pulse Temp Resp Height(growth percentile) Weight(growth percentile) 116/80 (BP 1 Location: Left arm, BP Patient Position: Sitting) 77 98 °F (36.7 °C) (Oral) 18 5' 5\" (1.651 m) 226 lb (102.5 kg) LMP SpO2 BMI OB Status Smoking Status 09/14/2018 97% 37.61 kg/m2 Having regular periods Never Smoker Vitals History BMI and BSA Data Body Mass Index Body Surface Area  
 37.61 kg/m 2 2.17 m 2 Preferred Pharmacy [facility] [address] [place] [address] [phone] Your Updated Medication List  
  
   
This list is accurate as of 9/20/18  8:47 AM.  Always use your most recent med list.  
  
  
  
  
 albuterol 90 mcg/actuation inhaler Commonly known as:  PROAIR HFA Take 1 Puff by inhalation every six (6) hours as needed for Wheezing or Shortness of Breath. amphetamine-dextroamphetamine XR 10 mg XR capsule Commonly known as:  ADDERALL XR Take 1 Cap (10 mg total) by mouth every morning. 90 day supply. cholecalciferol (VITAMIN D3) 5,000 unit Tab tablet Commonly known as:  VITAMIN D3 Take 1 Tab by mouth daily. diphenhydrAMINE 25 mg capsule Commonly known as:  BENADRYL Take 25 mg by mouth every six (6) hours as needed. escitalopram oxalate 10 mg tablet Commonly known as:  [place] Leriche TAKE 1 TABLET BY MOUTH DAILY. ethinyl estradiol-etonogestrel 0.12-0.015 mg/24 hr vaginal ring Commonly known as:  Unruly Combs Per month: Insert vaginally, leave in for three weeks, remove for one week  
  
 methIMAzole 5 mg tablet Commonly known as:  TAPAZOLE Take 1 Tab by mouth daily. multivitamin, tx-iron-ca-min 9 mg iron-400 mcg Tab tablet Commonly known as:  THERA-M w/ IRON Take 1 Tab by mouth daily. ZyrTEC 10 mg tablet Generic drug:  cetirizine Take 10 mg by mouth daily. We Performed the Following PNEUMOCOCCAL POLYSACCHARIDE VACCINE, 23-VALENT, ADULT OR IMMUNOSUPPRESSED PT DOSE, [09397 CPT(R)] TX IMMUNIZ ADMIN,1 SINGLE/COMB VAC/TOXOID [id number] CPT(R)] Introducing [facility] & HEALTH SERVICES! [employer] introduces Worldrat patient portal. Now you can access parts of your medical record, email your doctor's office, and request medication refills online. 1. In your internet browser, go to [email or web address] 2. Click on the First Time User? Click Here link in the Sign In box. You will see the New Member Sign Up page. 3. Enter your Worldrat Access Code exactly as it appears below. You will not need to use this code after youve completed the sign-up process. If you do not sign up before the expiration date, you must request a new code. · Worldrat Access Code: [email or web address] Expires: 12/19/2018  8:46 AM 
 
4. Enter the last four digits of your Social Security Number (xxxx) and Date of Birth (mm/dd/yyyy) as indicated and click Submit. You will be taken to the next sign-up page. 5. Create a Worldrat ID. This will be your Worldrat login ID and cannot be changed, so think of one that is secure and easy to remember. 6. Create a Worldrat password. You can change your password at any time. 7. Enter your Password Reset Question and Answer. This can be used at a later time if you forget your password. 8. Enter your e-mail address. You will receive e-mail notification when new information is available in [address] 9. Click Sign Up. You can now view and download portions of your medical record. 10. Click the Download Summary menu link to download a portable copy of your medical information. If you have questions, please visit the Frequently Asked Questions section of the Worldrat website. Remember, Worldrat is NOT to be used for urgent needs. For medical emergencies, dial 911. Now available from your iPhone and Android! Please provide this summary of care documentation to your next provider. Your primary care clinician is listed as Stacy Cheema.  If you have any questions after today's visit, please call [phone].

## 2018-09-27 ENCOUNTER — OFFICE VISIT (OUTPATIENT)
Dept: INTERNAL MEDICINE CLINIC | Age: 43
End: 2018-09-27

## 2018-09-27 VITALS
RESPIRATION RATE: 12 BRPM | DIASTOLIC BLOOD PRESSURE: 79 MMHG | HEIGHT: 65 IN | HEART RATE: 82 BPM | WEIGHT: 229 LBS | TEMPERATURE: 99.3 F | OXYGEN SATURATION: 99 % | BODY MASS INDEX: 38.15 KG/M2 | SYSTOLIC BLOOD PRESSURE: 120 MMHG

## 2018-09-27 DIAGNOSIS — E05.90 HYPERTHYROIDISM: ICD-10-CM

## 2018-09-27 DIAGNOSIS — R73.03 PREDIABETES: ICD-10-CM

## 2018-09-27 DIAGNOSIS — E66.01 SEVERE OBESITY (BMI 35.0-39.9): ICD-10-CM

## 2018-09-27 DIAGNOSIS — E55.9 VITAMIN D DEFICIENCY: ICD-10-CM

## 2018-09-27 DIAGNOSIS — Z00.00 WELL WOMAN EXAM (NO GYNECOLOGICAL EXAM): Primary | ICD-10-CM

## 2018-09-27 NOTE — MR AVS SNAPSHOT
Huong Madera 
 
 
 [address] [place] [address] [facility] 
[phone] Patient: Gopal Randle MRN: [id number] :1975 Visit Information Date & Time Provider Department Dept. Phone Encounter #  
 2018  3:20 PM Justice Serrato NP [facility] [address]  [id number] Your Appointments 10/24/2018  8:00 AM  
Office Visit with NELLIE Pablo [address] [facility] Appt Note: 1 year skin exam  
 [facility] [address] [facility] [address]  
[address] [facility] [address] [facility] [address] Upcoming Health Maintenance Date Due  
 PAP AKA CERVICAL CYTOLOGY 1/10/2019 Influenza Age 5 to Adult 3/29/2019* BREAST CANCER SCRN MAMMOGRAM 2020 DTaP/Tdap/Td series (2 - Td) 2026 *Topic was postponed. The date shown is not the original due date. Allergies as of 2018  Review Complete On: 2018 By: Justice Serrato NP Severity Noted Reaction Type Reactions Wellbutrin [Bupropion Hcl] Low 2014   Intolerance Other (comments) irritability Current Immunizations  Reviewed on 2018 Name Date Influenza Vaccine 2013 Influenza Vaccine (Quad) 2015  2:05 PM  
 Pneumococcal Polysaccharide (PPSV-23) 2018 Tdap 2016 Reviewed by Justice Serrato NP on 2018 at  3:56 PM  
You Were Diagnosed With   
  
 Codes Comments Well woman exam (no gynecological exam)    -  Primary ICD-10-CM: Z00.00 ICD-9-CM: V70.0 Hyperthyroidism     ICD-10-CM: E05.90 ICD-9-CM: 242.90 Vitamin D deficiency     ICD-10-CM: E55.9 ICD-9-CM: 268.9 Prediabetes     ICD-10-CM: R73.03 
ICD-9-CM: 790.29 Vitals BP Pulse Temp Resp Height(growth percentile) Weight(growth percentile)  120/79 (BP 1 Location: Left arm, BP Patient Position: Sitting) 82 99.3 °F (37.4 °C) (Oral) 12 5' 5\" (1.651 m) 229 lb (103.9 kg) LMP SpO2 BMI OB Status Smoking Status 09/14/2018 99% 38.11 kg/m2 Having regular periods Never Smoker BMI and BSA Data Body Mass Index Body Surface Area  
 38.11 kg/m 2 2.18 m 2 Preferred Pharmacy Pharmacy Name Phone [facility] [address] [place] [address] [phone] Your Updated Medication List  
  
   
This list is accurate as of 9/27/18  4:15 PM.  Always use your most recent med list.  
  
  
  
  
 albuterol 90 mcg/actuation inhaler Commonly known as:  PROAIR HFA Take 1 Puff by inhalation every six (6) hours as needed for Wheezing or Shortness of Breath. amphetamine-dextroamphetamine XR 10 mg XR capsule Commonly known as:  ADDERALL XR Take 1 Cap (10 mg total) by mouth every morning. 90 day supply. cholecalciferol (VITAMIN D3) 5,000 unit Tab tablet Commonly known as:  VITAMIN D3 Take 1 Tab by mouth daily. diphenhydrAMINE 25 mg capsule Commonly known as:  BENADRYL Take 25 mg by mouth every six (6) hours as needed. escitalopram oxalate 10 mg tablet Commonly known as:  Agustín Tia TAKE 1 TABLET BY MOUTH DAILY. ethinyl estradiol-etonogestrel 0.12-0.015 mg/24 hr vaginal ring Commonly known as:  Tobias Mcnally Per month: Insert vaginally, leave in for three weeks, remove for one week  
  
 methIMAzole 5 mg tablet Commonly known as:  TAPAZOLE Take 1 Tab by mouth daily. multivitamin, tx-iron-ca-min 9 mg iron-400 mcg Tab tablet Commonly known as:  THERA-M w/ IRON Take 1 Tab by mouth daily. ZyrTEC 10 mg tablet Generic drug:  cetirizine Take 10 mg by mouth daily. We Performed the Following CBC WITH AUTOMATED DIFF [69509 CPT(R)] HEMOGLOBIN A1C WITH EAG [81189 CPT(R)] LIPID PANEL [41829 CPT(R)] METABOLIC PANEL, COMPREHENSIVE [61320 CPT(R)] URINALYSIS W/ RFLX MICROSCOPIC [94229 CPT(R)] VITAMIN D, 25 HYDROXY [id number] CPT(R)] Patient Instructions Learning About Vitamin D Why is it important to get enough vitamin D? Your body needs vitamin D to absorb calcium. Calcium keeps your bones and muscles, including your heart, healthy and strong. If your muscles don't get enough calcium, they can cramp, hurt, or feel weak. You may have long-term (chronic) muscle aches and pains. If you don't get enough vitamin D throughout life, you have an increased chance of having thin and brittle bones (osteoporosis) in your later years. Children who don't get enough vitamin D may not grow as much as others their age. They also have a chance of getting a rare disease called rickets. It causes weak bones. Vitamin D and calcium are added to many foods. And your body uses sunshine to make its own vitamin D. How much vitamin D do you need? The [employer] recommends that people ages 3 through 79 get 600 IU (international units) every day. Adults 71 and older need 800 IU every day. Blood tests for vitamin D can check your vitamin D level. But there is no standard normal range used by all laboratories. The [employer] of [employer] recommends a blood level of 20 ng/mL of vitamin D for healthy bones. And most people in the [place] and [facility]) meet this goal. 
How can you get more vitamin D? Foods that contain vitamin D include: 
· Hillister, tuna, and mackerel. These are some of the best foods to eat when you need to get more vitamin D. 
· Cheese, egg yolks, and beef liver. These foods have vitamin D in small amounts. · Milk, soy drinks, orange juice, yogurt, margarine, and some kinds of cereal have vitamin D added to them. Some people don't make vitamin D as well as others. They may have to take extra care in getting enough vitamin D. Things that reduce how much vitamin D your body makes include: · Dark skin, such as many  Americans have. · Age, especially if you are older than 72. · Digestive problems, such as Crohn's or celiac disease. · Liver and kidney disease. Some people who do not get enough vitamin D may need supplements. Are there any risks from taking vitamin D? 
· Too much vitamin D: 
¨ Can damage your kidneys. ¨ Can cause nausea and vomiting, constipation, and weakness. ¨ Raises the amount of calcium in your blood. If this happens, you can get confused or have an irregular heart rhythm. · Vitamin D may interact with other medicines. Tell your doctor about all of the medicines you take, including over-the-counter drugs, herbs, and pills. Tell your doctor about all of your current medical problems. Where can you learn more? Go to http://em-yenny.info/. Enter [id number] in the search box to learn more about \"Learning About Vitamin D.\" 
Current as of: May 12, 2017 Content Version: 11.7 © 9574-5990 [employer]ov[employer]. Care instructions adapted under license by [employer] (which disclaims liability or warranty for this information). If you have questions about a medical condition or this instruction, always ask your healthcare professional. [place] [address] any warranty or liability for your use of this information. Well Visit, Ages 25 to 48: Care Instructions Your Care Instructions Physical exams can help you stay healthy. Your doctor has checked your overall health and may have suggested ways to take good care of yourself. He or she also may have recommended tests. At home, you can help prevent illness with healthy eating, regular exercise, and other steps. Follow-up care is a key part of your treatment and safety. Be sure to make and go to all appointments, and call your doctor if you are having problems. It's also a good idea to know your test results and keep a list of the medicines you take. How can you care for yourself at home? · Reach and stay at a healthy weight.  This will lower your risk for many problems, such as obesity, diabetes, heart disease, and high blood pressure. · Get at least 30 minutes of physical activity on most days of the week. Walking is a good choice. You also may want to do other activities, such as running, swimming, cycling, or playing tennis or team sports. Discuss any changes in your exercise program with your doctor. · Do not smoke or allow others to smoke around you. If you need help quitting, talk to your doctor about stop-smoking programs and medicines. These can increase your chances of quitting for good. · Talk to your doctor about whether you have any risk factors for sexually transmitted infections (STIs). Having one sex partner (who does not have STIs and does not have sex with anyone else) is a good way to avoid these infections. · Use birth control if you do not want to have children at this time. Talk with your doctor about the choices available and what might be best for you. · Protect your skin from too much sun. When you're outdoors from 10 a.m. to 4 p.m., stay in the shade or cover up with clothing and a hat with a wide brim. Wear sunglasses that block UV rays. Even when it's cloudy, put broad-spectrum sunscreen (SPF 30 or higher) on any exposed skin. · See a dentist one or two times a year for checkups and to have your teeth cleaned. · Wear a seat belt in the car. · Drink alcohol in moderation, if at all. That means no more than 2 drinks a day for men and 1 drink a day for women. Follow your doctor's advice about when to have certain tests. These tests can spot problems early. For everyone · Cholesterol. Have the fat (cholesterol) in your blood tested after age 21. Your doctor will tell you how often to have this done based on your age, family history, or other things that can increase your risk for heart disease. · Blood pressure.  Have your blood pressure checked during a routine doctor visit. Your doctor will tell you how often to check your blood pressure based on your age, your blood pressure results, and other factors. · Vision. Talk with your doctor about how often to have a glaucoma test. 
· Diabetes. Ask your doctor whether you should have tests for diabetes. · Colon cancer. Have a test for colon cancer at age 48. You may have one of several tests. If you are younger than 48, you may need a test earlier if you have any risk factors. Risk factors include whether you already had a precancerous polyp removed from your colon or whether your parent, brother, sister, or child has had colon cancer. For women · Breast exam and mammogram. Talk to your doctor about when you should have a clinical breast exam and a mammogram. Medical experts differ on whether and how often women under 50 should have these tests. Your doctor can help you decide what is right for you. · Pap test and pelvic exam. Begin Pap tests at age 24. A Pap test is the best way to find cervical cancer. The test often is part of a pelvic exam. Ask how often to have this test. 
· Tests for sexually transmitted infections (STIs). Ask whether you should have tests for STIs. You may be at risk if you have sex with more than one person, especially if your partners do not wear condoms. For men · Tests for sexually transmitted infections (STIs). Ask whether you should have tests for STIs. You may be at risk if you have sex with more than one person, especially if you do not wear a condom. · Testicular cancer exam. Ask your doctor whether you should check your testicles regularly. · Prostate exam. Talk to your doctor about whether you should have a blood test (called a PSA test) for prostate cancer. Experts differ on whether and when men should have this test. Some experts suggest it if you are older than 39 and are -American or have a father or brother who got prostate cancer when he was younger than 72. When should you call for help? Watch closely for changes in your health, and be sure to contact your doctor if you have any problems or symptoms that concern you. Where can you learn more? Go to http://em-yenny.info/. Enter P072 in the search box to learn more about \"Well Visit, Ages 25 to 48: Care Instructions. \" Current as of: May 16, 2017 Content Version: 11.7 © 7438-5351 [employer]. Care instructions adapted under license by [employer] (which disclaims liability or warranty for this information). If you have questions about a medical condition or this instruction, always ask your healthcare professional. [address] any warranty or liability for your use of this information. Introducing [facility]! [employer] introduces HealthyChic patient portal. Now you can access parts of your medical record, email your doctor's office, and request medication refills online. 1. In your internet browser, go to [email or web address] 2. Click on the First Time User? Click Here link in the Sign In box. You will see the New Member Sign Up page. 3. Enter your HealthyChic Access Code exactly as it appears below. You will not need to use this code after youve completed the sign-up process. If you do not sign up before the expiration date, you must request a new code. · HealthyChic Access Code: [email or web address] Expires: 12/19/2018  8:46 AM 
 
4. Enter the last four digits of your Social Security Number (xxxx) and Date of Birth (mm/dd/yyyy) as indicated and click Submit. You will be taken to the next sign-up page. 5. Create a iSECUREtract ID. This will be your HealthyChic login ID and cannot be changed, so think of one that is secure and easy to remember. 6. Create a HealthyChic password. You can change your password at any time. 7. Enter your Password Reset Question and Answer.  This can be used at a later time if you forget your password. 8. Enter your e-mail address. You will receive e-mail notification when new information is available in [address] 9. Click Sign Up. You can now view and download portions of your medical record. 10. Click the Download Summary menu link to download a portable copy of your medical information. If you have questions, please visit the Frequently Asked Questions section of the [employer] website. Remember, [employer] is NOT to be used for urgent needs. For medical emergencies, dial 911. Now available from your iPhone and Android! Please provide this summary of care documentation to your next provider. Your primary care clinician is listed as Kimmy Thomason. If you have any questions after today's visit, please call [phone].

## 2018-09-27 NOTE — PROGRESS NOTES
HISTORY OF PRESENT ILLNESS Naresh Fernandez is a 44 y.o. female. HPI 
 
ROS Physical Exam 
 
 
Subjective:  
44 y.o. female for Well Woman Check. Her gyne and breast care is done elsewhere by her Ob-Gyne physician. Sees Dr Anthony Quintero and is up to date with Pap smear and mammogram.   
 
She will return for fasting labs and has biometric form to be completed. Sees Dr Melania Wilson for management of Hyperthyroidism and has been tapering down on her Tapazole. Has been prediabetic in past and has been trying to work on her weight and exercise. Current Outpatient Prescriptions Medication Sig Dispense Refill  escitalopram oxalate (LEXAPRO) 10 mg tablet TAKE 1 TABLET BY MOUTH DAILY. 90 Tab 1  
 amphetamine-dextroamphetamine XR (ADDERALL XR) 10 mg XR capsule Take 1 Cap (10 mg total) by mouth every morning. 90 day supply. 90 Cap 0  
 multivitamin, tx-iron-ca-min (THERA-M W/ IRON) 9 mg iron-400 mcg tab tablet Take 1 Tab by mouth daily.  ethinyl estradiol-etonogestrel (NUVARING) 0.12-0.015 mg/24 hr vaginal ring Per month: Insert vaginally, leave in for three weeks, remove for one week 3 Device 4  cholecalciferol, VITAMIN D3, (VITAMIN D3) 5,000 unit tab tablet Take 1 Tab by mouth daily. 30 Tab 5  cetirizine (ZYRTEC) 10 mg tablet Take 10 mg by mouth daily.  methimazole (TAPAZOLE) 5 mg tablet Take 1 Tab by mouth daily. (Patient taking differently: Take 2.5 mg by mouth daily. Daily) 90 Tab 0  
 albuterol (PROAIR HFA) 90 mcg/actuation inhaler Take 1 Puff by inhalation every six (6) hours as needed for Wheezing or Shortness of Breath. 3 Inhaler 3  
 diphenhydrAMINE (BENADRYL) 25 mg capsule Take 25 mg by mouth every six (6) hours as needed. Allergies Allergen Reactions  Wellbutrin [Bupropion Hcl] Other (comments) irritability Past Medical History:  
Diagnosis Date  ADHD (attention deficit hyperactivity disorder) 2008  
 adult. stopped meds 2015, doing well  Allergy to mold spores  Anxiety 3/2012  
 first rx age 33 5  Asthma   
 mild.  Breast nodule 3/2014 R 12:00.  dense tissue. Dr. Shawn Webb  Environmental allergies   
 hx shots as a child  Family history of skin cancer   
 maternal aunt and uncle  Graves disease 2012  
 with toxic MNG. Dr. Hector Noel  H/O mammogram 05/11/2017; 7/31/18 Negative; Negative  WAGNER (obstructive sleep apnea)   
 mild. Dr. Tsering Arce. dental appliance.  Vitamin D deficiency Past Surgical History:  
Procedure Laterality Date  HX HEENT    
 wisdom teeth extraction  HX KNEE ARTHROSCOPY  2003  
 right Family History Problem Relation Age of Onset  Cancer Mother   
  cervical  
 Diabetes Mother  COPD Mother  Stroke Mother  Hypertension Mother  Diabetes Father  Hypertension Father  Stroke Father  Thyroid Disease Sister  Obesity Sister  Thyroid Disease Sister  Alzheimer Maternal Grandmother  COPD Maternal Grandfather  Clotting Disorder Maternal Grandfather   
  multiple DVTs  Alzheimer Paternal Grandmother  Alcohol abuse Paternal Grandfather  Hypertension Paternal Grandfather Social History Substance Use Topics  Smoking status: Never Smoker  Smokeless tobacco: Never Used  Alcohol use Yes Comment: 3-4 per month ROS: Feeling generally well. No TIA's or unusual headaches, no dysphagia. No prolonged cough. No dyspnea or chest pain on exertion. No abdominal pain, change in bowel habits, black or bloody stools. No urinary tract symptoms. No new or unusual musculoskeletal symptoms. Specific concerns today: Biometric screening. Objective: The patient appears well, alert, oriented x 3, in no distress. Visit Vitals  /79 (BP 1 Location: Left arm, BP Patient Position: Sitting)  Pulse 82  Temp 99.3 °F (37.4 °C) (Oral)  Resp 12  Ht 5' 5\" (1.651 m)  Wt 229 lb (103.9 kg)  LMP 09/14/2018  SpO2 99%  BMI 38.11 kg/m2 ENT normal.  Neck supple. No adenopathy or thyromegaly. GENIA. Lungs are clear, good air entry, no wheezes, rhonchi or rales. S1 and S2 normal, no murmurs, regular rate and rhythm. Abdomen soft without tenderness, guarding, mass or organomegaly. Extremities show no edema, normal peripheral pulses. Neurological is normal, no focal findings. Breast and Pelvic exams are deferred. Assessment/Plan:  
Well Woman 
lose weight, increase physical activity, routine labs ordered Diagnoses and all orders for this visit: 
 
1. Well woman exam (no gynecological exam) -     CBC WITH AUTOMATED DIFF 
-     LIPID PANEL 
-     METABOLIC PANEL, COMPREHENSIVE 
-     URINALYSIS W/ RFLX MICROSCOPIC 2. Hyperthyroidism -- managed by Endocrinology 3. Vitamin D deficiency 
-     VITAMIN D, 25 HYDROXY 4. Prediabetes 
-     HEMOGLOBIN A1C WITH EAG 5. Severe obesity (BMI 35.0-39.9) ([address] -- discussed dietary changes and exercise options. Hilaria Fernandez was counseled on age-appropriate/ guideline-based risk prevention behaviors and screening for a 44y.o. year old   female . We also discussed adjustments in screening based on family history if necessary. Printed instructions for preventative screening guidelines and healthy behaviors given to patient with after visit summary. lab results and schedule of future lab studies reviewed with patient 
reviewed diet, exercise and weight control 
cardiovascular risk and specific lipid/LDL goals reviewed 
reviewed medications and side effects in detail This note will not be viewable in [address]

## 2018-09-27 NOTE — PATIENT INSTRUCTIONS
Learning About Vitamin D Why is it important to get enough vitamin D? Your body needs vitamin D to absorb calcium. Calcium keeps your bones and muscles, including your heart, healthy and strong. If your muscles don't get enough calcium, they can cramp, hurt, or feel weak. You may have long-term (chronic) muscle aches and pains. If you don't get enough vitamin D throughout life, you have an increased chance of having thin and brittle bones (osteoporosis) in your later years. Children who don't get enough vitamin D may not grow as much as others their age. They also have a chance of getting a rare disease called rickets. It causes weak bones. Vitamin D and calcium are added to many foods. And your body uses sunshine to make its own vitamin D. How much vitamin D do you need? The [place] recommends that people ages 3 through 79 get 600 IU (international units) every day. Adults 71 and older need 800 IU every day. Blood tests for vitamin D can check your vitamin D level. But there is no standard normal range used by all laboratories. The [place] of [employer] recommends a blood level of 20 ng/mL of vitamin D for healthy bones. And most people in the [place] and [facility] meet this goal. 
How can you get more vitamin D? Foods that contain vitamin D include: 
· [place], tuna, and mackerel. These are some of the best foods to eat when you need to get more vitamin D. 
· Cheese, egg yolks, and beef liver. These foods have vitamin D in small amounts. · Milk, soy drinks, orange juice, yogurt, margarine, and some kinds of cereal have vitamin D added to them. Some people don't make vitamin D as well as others. They may have to take extra care in getting enough vitamin D. Things that reduce how much vitamin D your body makes include: · Dark skin, such as many  Americans have. · Age, especially if you are older than 72. · Digestive problems, such as Crohn's or celiac disease. · Liver and kidney disease. Some people who do not get enough vitamin D may need supplements. Are there any risks from taking vitamin D? 
· Too much vitamin D: 
¨ Can damage your kidneys. ¨ Can cause nausea and vomiting, constipation, and weakness. ¨ Raises the amount of calcium in your blood. If this happens, you can get confused or have an irregular heart rhythm. · Vitamin D may interact with other medicines. Tell your doctor about all of the medicines you take, including over-the-counter drugs, herbs, and pills. Tell your doctor about all of your current medical problems. Where can you learn more? Go to http://emwebtideyenny.info/. Enter [id number] in the search box to learn more about \"Learning About Vitamin D.\" 
Current as of: May 12, 2017 Content Version: 11.7 © 0034-5504 [employer]. Care instructions adapted under license by [employer] (which disclaims liability or warranty for this information). If you have questions about a medical condition or this instruction, always ask your healthcare professional. [place] [address] any warranty or liability for your use of this information. Well Visit, Ages 25 to 48: Care Instructions Your Care Instructions Physical exams can help you stay healthy. Your doctor has checked your overall health and may have suggested ways to take good care of yourself. He or she also may have recommended tests. At home, you can help prevent illness with healthy eating, regular exercise, and other steps. Follow-up care is a key part of your treatment and safety. Be sure to make and go to all appointments, and call your doctor if you are having problems. It's also a good idea to know your test results and keep a list of the medicines you take. How can you care for yourself at home? · Reach and stay at a healthy weight.  This will lower your risk for many problems, such as obesity, diabetes, heart disease, and high blood pressure. · Get at least 30 minutes of physical activity on most days of the week. Walking is a good choice. You also may want to do other activities, such as running, swimming, cycling, or playing tennis or team sports. Discuss any changes in your exercise program with your doctor. · Do not smoke or allow others to smoke around you. If you need help quitting, talk to your doctor about stop-smoking programs and medicines. These can increase your chances of quitting for good. · Talk to your doctor about whether you have any risk factors for sexually transmitted infections (STIs). Having one sex partner (who does not have STIs and does not have sex with anyone else) is a good way to avoid these infections. · Use birth control if you do not want to have children at this time. Talk with your doctor about the choices available and what might be best for you. · Protect your skin from too much sun. When you're outdoors from 10 a.m. to 4 p.m., stay in the shade or cover up with clothing and a hat with a wide brim. Wear sunglasses that block UV rays. Even when it's cloudy, put broad-spectrum sunscreen (SPF 30 or higher) on any exposed skin. · See a dentist one or two times a year for checkups and to have your teeth cleaned. · Wear a seat belt in the car. · Drink alcohol in moderation, if at all. That means no more than 2 drinks a day for men and 1 drink a day for women. Follow your doctor's advice about when to have certain tests. These tests can spot problems early. For everyone · Cholesterol. Have the fat (cholesterol) in your blood tested after age 21. Your doctor will tell you how often to have this done based on your age, family history, or other things that can increase your risk for heart disease. · Blood pressure.  Have your blood pressure checked during a routine doctor visit. Your doctor will tell you how often to check your blood pressure based on your age, your blood pressure results, and other factors. · Vision. Talk with your doctor about how often to have a glaucoma test. 
· Diabetes. Ask your doctor whether you should have tests for diabetes. · Colon cancer. Have a test for colon cancer at age 48. You may have one of several tests. If you are younger than 48, you may need a test earlier if you have any risk factors. Risk factors include whether you already had a precancerous polyp removed from your colon or whether your parent, brother, sister, or child has had colon cancer. For women · Breast exam and mammogram. Talk to your doctor about when you should have a clinical breast exam and a mammogram. Medical experts differ on whether and how often women under 50 should have these tests. Your doctor can help you decide what is right for you. · Pap test and pelvic exam. Begin Pap tests at age 24. A Pap test is the best way to find cervical cancer. The test often is part of a pelvic exam. Ask how often to have this test. 
· Tests for sexually transmitted infections (STIs). Ask whether you should have tests for STIs. You may be at risk if you have sex with more than one person, especially if your partners do not wear condoms. For men · Tests for sexually transmitted infections (STIs). Ask whether you should have tests for STIs. You may be at risk if you have sex with more than one person, especially if you do not wear a condom. · Testicular cancer exam. Ask your doctor whether you should check your testicles regularly. · Prostate exam. Talk to your doctor about whether you should have a blood test (called a PSA test) for prostate cancer. Experts differ on whether and when men should have this test. Some experts suggest it if you are older than 39 and are -American or have a father or brother who got prostate cancer when he was younger than 72. When should you call for help? Watch closely for changes in your health, and be sure to contact your doctor if you have any problems or symptoms that concern you. Where can you learn more? Go to http://em-yenny.info/. Enter P072 in the search box to learn more about \"Well Visit, Ages 25 to 48: Care Instructions. \" Current as of: May 16, 2017 Content Version: 11.7 © 9138-0493 [employer]. Care instructions adapted under license by [employer] (which disclaims liability or warranty for this information). If you have questions about a medical condition or this instruction, always ask your healthcare professional. [address] any warranty or liability for your use of this information.

## 2018-10-01 LAB
25(OH)D3+25(OH)D2 SERPL-MCNC: 56.3 NG/ML (ref 30–100)
ALBUMIN SERPL-MCNC: 3.9 G/DL (ref 3.5–5.5)
ALBUMIN/GLOB SERPL: 1.6 {RATIO} (ref 1.2–2.2)
ALP SERPL-CCNC: 57 IU/L (ref 39–117)
ALT SERPL-CCNC: 16 IU/L (ref 0–32)
APPEARANCE UR: CLEAR
AST SERPL-CCNC: 14 IU/L (ref 0–40)
BASOPHILS # BLD AUTO: 0.1 X10E3/UL (ref 0–0.2)
BASOPHILS NFR BLD AUTO: 1 %
BILIRUB SERPL-MCNC: 0.4 MG/DL (ref 0–1.2)
BILIRUB UR QL STRIP: NEGATIVE
BUN SERPL-MCNC: 9 MG/DL (ref 6–24)
BUN/CREAT SERPL: 12 (ref 9–23)
CALCIUM SERPL-MCNC: 9 MG/DL (ref 8.7–10.2)
CHLORIDE SERPL-SCNC: 103 MMOL/L (ref 96–106)
CHOLEST SERPL-MCNC: 212 MG/DL (ref 100–199)
CO2 SERPL-SCNC: 23 MMOL/L (ref 20–29)
COLOR UR: YELLOW
CREAT SERPL-MCNC: 0.76 MG/DL (ref 0.57–1)
EOSINOPHIL # BLD AUTO: 0.4 X10E3/UL (ref 0–0.4)
EOSINOPHIL NFR BLD AUTO: 5 %
ERYTHROCYTE [DISTWIDTH] IN BLOOD BY AUTOMATED COUNT: 13.7 % (ref 12.3–15.4)
EST. AVERAGE GLUCOSE BLD GHB EST-MCNC: 123 MG/DL
GLOBULIN SER CALC-MCNC: 2.5 G/DL (ref 1.5–4.5)
GLUCOSE SERPL-MCNC: 104 MG/DL (ref 65–99)
GLUCOSE UR QL: NEGATIVE
HBA1C MFR BLD: 5.9 % (ref 4.8–5.6)
HCT VFR BLD AUTO: 39.8 % (ref 34–46.6)
HDLC SERPL-MCNC: 63 MG/DL
HGB BLD-MCNC: 12.9 G/DL (ref 11.1–15.9)
HGB UR QL STRIP: NEGATIVE
IMM GRANULOCYTES # BLD: 0 X10E3/UL (ref 0–0.1)
IMM GRANULOCYTES NFR BLD: 0 %
INTERPRETATION, 910389: NORMAL
KETONES UR QL STRIP: NEGATIVE
LDLC SERPL CALC-MCNC: 112 MG/DL (ref 0–99)
LEUKOCYTE ESTERASE UR QL STRIP: NEGATIVE
LYMPHOCYTES # BLD AUTO: 3.7 X10E3/UL (ref 0.7–3.1)
LYMPHOCYTES NFR BLD AUTO: 47 %
MCH RBC QN AUTO: 28 PG (ref 26.6–33)
MCHC RBC AUTO-ENTMCNC: 32.4 G/DL (ref 31.5–35.7)
MCV RBC AUTO: 87 FL (ref 79–97)
MICRO URNS: NORMAL
MONOCYTES # BLD AUTO: 0.4 X10E3/UL (ref 0.1–0.9)
MONOCYTES NFR BLD AUTO: 4 %
NEUTROPHILS # BLD AUTO: 3.5 X10E3/UL (ref 1.4–7)
NEUTROPHILS NFR BLD AUTO: 43 %
NITRITE UR QL STRIP: NEGATIVE
PH UR STRIP: 5.5 [PH] (ref 5–7.5)
PLATELET # BLD AUTO: 415 X10E3/UL (ref 150–379)
POTASSIUM SERPL-SCNC: 4.3 MMOL/L (ref 3.5–5.2)
PROT SERPL-MCNC: 6.4 G/DL (ref 6–8.5)
PROT UR QL STRIP: NEGATIVE
RBC # BLD AUTO: 4.6 X10E6/UL (ref 3.77–5.28)
SODIUM SERPL-SCNC: 139 MMOL/L (ref 134–144)
SP GR UR: 1.02 (ref 1–1.03)
TRIGL SERPL-MCNC: 184 MG/DL (ref 0–149)
UROBILINOGEN UR STRIP-MCNC: 0.2 MG/DL (ref 0.2–1)
VLDLC SERPL CALC-MCNC: 37 MG/DL (ref 5–40)
WBC # BLD AUTO: 8 X10E3/UL (ref 3.4–10.8)

## 2018-10-02 ENCOUNTER — TELEPHONE (OUTPATIENT)
Dept: INTERNAL MEDICINE CLINIC | Age: 43
End: 2018-10-02

## 2018-10-02 NOTE — TELEPHONE ENCOUNTER
I called pt, no answer. LM on personal VM that form is ready for  at our . NP mailed pt her lab results with recommendations. Call back for questions.

## 2018-10-24 ENCOUNTER — OFFICE VISIT (OUTPATIENT)
Dept: DERMATOLOGY | Facility: AMBULATORY SURGERY CENTER | Age: 43
End: 2018-10-24

## 2018-10-24 VITALS
TEMPERATURE: 97.5 F | BODY MASS INDEX: 38.2 KG/M2 | SYSTOLIC BLOOD PRESSURE: 120 MMHG | WEIGHT: 229.3 LBS | DIASTOLIC BLOOD PRESSURE: 88 MMHG | HEIGHT: 65 IN | HEART RATE: 70 BPM | OXYGEN SATURATION: 99 %

## 2018-10-24 DIAGNOSIS — D22.9 MULTIPLE BENIGN NEVI: Primary | ICD-10-CM

## 2018-10-24 DIAGNOSIS — Z87.2 HISTORY OF NEVUS EXCISION: ICD-10-CM

## 2018-10-24 DIAGNOSIS — Z80.8 FAMILY HISTORY OF MALIGNANT MELANOMA: ICD-10-CM

## 2018-10-24 DIAGNOSIS — D18.01 CHERRY ANGIOMA: ICD-10-CM

## 2018-10-24 DIAGNOSIS — L82.1 SEBORRHEIC KERATOSES: ICD-10-CM

## 2018-10-24 DIAGNOSIS — Z12.83 SCREENING FOR MALIGNANT NEOPLASM OF SKIN: ICD-10-CM

## 2018-10-24 DIAGNOSIS — Z98.890 HISTORY OF NEVUS EXCISION: ICD-10-CM

## 2018-10-24 NOTE — PROGRESS NOTES
Chief Complaint   Patient presents with    Skin Exam     1 year       1. Have you been to the ER, urgent care clinic since your last visit? Hospitalized since your last visit? No    2. Have you seen or consulted any other health care providers outside of the [address] [place] [address] since your last visit? Include any pap smears or colon screening.  Yes When: Dr. Ebbie Olszewski Endocrinologist; Dr. Davin Jama sleep specialists 3 months ago

## 2018-10-24 NOTE — PROGRESS NOTES
Written by Shy Tinajero, as dictated by Tr Wilks [place], [address]. Name: Francisco Meredith       Age: 44 y.o. Date: 10/24/2018    Chief Complaint:   Chief Complaint   Patient presents with    Skin Exam     1 year       Subjective:    HPI  Ms. Shayy Fernandez is a 44 y.o. female who presents for a full skin exam.  The patient's last skin exam was on 10/24/17 and the patient does not have current complaints related to her skin. She is feeling well and in her usual state of health today. She has no current illnesses, no other skin concerns. Her allergies, medications, medical, and social history are reviewed by me today. The patient's pertinent skin history includes : multiple nevi removed, malignant melanoma in her aunt and uncle    ROS: Constitutional: Negative. 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       Employer: [employer]   Tobacco Use    Smoking status: Never Smoker    Smokeless tobacco: Never Used   Substance and Sexual Activity    Alcohol use: Yes     Comment: 3-4 per month    Drug use: No    Sexual activity: Yes     Partners: Male     Birth control/protection: Inserts   Other Topics Concern    Not on file   Social History Narrative    Not on file       Family History   Problem Relation Age of Onset    Cancer Mother         cervical    Diabetes Mother     COPD Mother     Stroke Mother     Hypertension Mother     Diabetes Father    [facility] Hypertension Father     Stroke Father     Thyroid Disease Sister     Obesity Sister     Thyroid Disease Sister     Alzheimer Maternal Grandmother     COPD Maternal Grandfather     Clotting Disorder Maternal Grandfather         multiple DVTs    Alzheimer Paternal Grandmother     Alcohol abuse Paternal Grandfather     Hypertension Paternal Grandfather        Past Medical History:   Diagnosis Date    ADHD (attention deficit hyperactivity disorder) 2008    adult. stopped meds 2015, doing well    Allergy to mold spores     Anxiety 3/2012    first rx age 33 5    Asthma     mild.  Breast nodule 3/2014    R 12:00.  dense tissue. Dr. 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 Dr. Payne Going H/O mammogram 05/11/2017; 7/31/18    Negative; Negative    WAGNER (obstructive sleep apnea)     mild. Dr. Alexandria Ardon. dental appliance.  Vitamin D deficiency        Past Surgical History:   Procedure Laterality Date    HX HEENT      wisdom teeth extraction    HX KNEE ARTHROSCOPY  2003    right       Current Outpatient Medications   Medication Sig Dispense Refill    escitalopram oxalate (LEXAPRO) 10 mg tablet TAKE 1 TABLET BY MOUTH DAILY. 90 Tab 1    amphetamine-dextroamphetamine XR (ADDERALL XR) 10 mg XR capsule Take 1 Cap (10 mg total) by mouth every morning. 90 day supply. 90 Cap 0    multivitamin, tx-iron-ca-min (THERA-M W/ IRON) 9 mg iron-400 mcg tab tablet Take 1 Tab by mouth daily.  ethinyl estradiol-etonogestrel (NUVARING) 0.12-0.015 mg/24 hr vaginal ring Per month: Insert vaginally, leave in for three weeks, remove for one week 3 Device 4    albuterol (PROAIR HFA) 90 mcg/actuation inhaler Take 1 Puff by inhalation every six (6) hours as needed for Wheezing or Shortness of Breath.  3 Inhaler 3    diphenhydrAMINE (BENADRYL) 25 mg capsule Take 25 mg by mouth every six (6) hours as needed.  cholecalciferol, VITAMIN D3, (VITAMIN D3) 5,000 unit tab tablet Take 1 Tab by mouth daily. 30 Tab 5    cetirizine (ZYRTEC) 10 mg tablet Take 10 mg by mouth daily.  methimazole (TAPAZOLE) 5 mg tablet Take 1 Tab by mouth daily. (Patient taking differently: Take 2.5 mg by mouth daily. Daily) 90 Tab 0       Allergies   Allergen Reactions    Wellbutrin [Bupropion Hcl] Other (comments)     irritability         Objective:    Visit Vitals  /88 (BP 1 Location: Left arm, BP Patient Position: Sitting)   Pulse 70   Temp 97.5 °F (36.4 °C) (Oral)   Ht 5' 5\" (1.651 m)   Wt 229 lb 4.8 oz (104 kg)   SpO2 99%   BMI 38.16 kg/m²       Hilaria Fernandez is a 44 y.o. female who appears well and in no distress. She is awake, alert, and oriented. There is no preauricular, submandibular, or cervical lymphadenopathy. A skin examination was performed including her scalp, face (including eyelids), ears, neck, chest, back, abdomen, upper extremities (including digits/nails), lower extremities, breasts, buttocks; genital skin was not examined. She has pink and brown intradermal nevi and medium and dark brown junctional nevi, some two-toned no concerning features for severe atypia. She has larger hallmark nevi that are two toned. She has two adjacent pink and brown macules consistent with nevi on her right shoulder--photographed for monitoring. She has scattered waxy macules and keratotic papules consistent with seborrheic keratoses. She has scattered red papules consistent with cherry angiomas. Assessment/Plan:    1. Family history of melanoma skin cancer. I discussed sun protection, sunscreen use, the warning signs of skin cancer, the need for self-skin examinations, and the need for regular practitioner exams every 1 year. The patient should follow up sooner as needed if new, changing, or symptomatic skin lesions arise. 2. Normal nevi. The diagnosis of normal nevi was reviewed.   I discussed sun protection, sunscreen use, the warning signs of skin cancer, the need for self-skin examinations, and the need for regular practitioner exams every 1 year. The patient should follow up sooner as needed if new, changing, or symptomatic skin lesions arise. 3. Seborrheic keratoses. The diagnosis was reviewed and the patient was reassured that no treatment is needed for these benign lesions. 4. Cherry angiomas. The diagnosis was reviewed and the patient was reassured that no treatment is needed for these benign lesions. This plan was reviewed with the patient and patient agrees. All questions were answered. This scribe documentation was reviewed by me and accurately reflects the examination and decisions made by me.

## 2018-10-30 ENCOUNTER — OFFICE VISIT (OUTPATIENT)
Dept: INTERNAL MEDICINE CLINIC | Age: 43
End: 2018-10-30

## 2018-10-30 VITALS
DIASTOLIC BLOOD PRESSURE: 87 MMHG | BODY MASS INDEX: 37.72 KG/M2 | SYSTOLIC BLOOD PRESSURE: 138 MMHG | OXYGEN SATURATION: 99 % | TEMPERATURE: 98.4 F | RESPIRATION RATE: 12 BRPM | WEIGHT: 226.4 LBS | HEIGHT: 65 IN | HEART RATE: 91 BPM

## 2018-10-30 DIAGNOSIS — J30.89 NON-SEASONAL ALLERGIC RHINITIS, UNSPECIFIED TRIGGER: Primary | ICD-10-CM

## 2018-10-30 DIAGNOSIS — J45.21 MILD INTERMITTENT ASTHMA WITH EXACERBATION: ICD-10-CM

## 2018-10-30 RX ORDER — PROMETHAZINE HYDROCHLORIDE AND CODEINE PHOSPHATE 6.25; 1 MG/5ML; MG/5ML
1 SOLUTION ORAL
Qty: 240 ML | Refills: 0 | Status: SHIPPED | OUTPATIENT
Start: 2018-10-30 | End: 2019-04-24 | Stop reason: ALTCHOICE

## 2018-10-30 RX ORDER — DOXYCYCLINE 100 MG/1
100 TABLET ORAL 2 TIMES DAILY
Qty: 14 TAB | Refills: 0 | Status: SHIPPED | OUTPATIENT
Start: 2018-10-30 | End: 2019-04-24 | Stop reason: ALTCHOICE

## 2018-10-30 RX ORDER — PREDNISONE 20 MG/1
TABLET ORAL
Qty: 8 TAB | Refills: 0 | Status: SHIPPED | OUTPATIENT
Start: 2018-10-30 | End: 2019-04-24 | Stop reason: ALTCHOICE

## 2018-10-30 RX ORDER — MONTELUKAST SODIUM 10 MG/1
10 TABLET ORAL DAILY
Qty: 30 TAB | Refills: 3 | Status: SHIPPED | OUTPATIENT
Start: 2018-10-30 | End: 2019-02-27 | Stop reason: SDUPTHER

## 2018-10-30 NOTE — PROGRESS NOTES
HISTORY OF PRESENT ILLNESS  Stefanie Fernandez is a 44 y.o. female. HPI  Presents with complaints of sneezing, runny nose, dry cough that began 5 days ago. Initially thought she was developing cold so she took Zicam without much improvement. Then felt like allergies were flaring so she has been using Allegra and Delsym for cough. Has been feeling more tight in chest and has increased her use of Albuterol inhaler; has history of exercise induced asthma but recently has been flaring more due to weather and seasonal changes. Denies purulent mucous, fever, chills. She was seen for similar symptoms at medical clinic at [facility] in early September and treated with Amoxil and Phenergan with codeine cough syrup. Felt like symptoms cleared. Reports significant allergies in childhood and received allergy injections twice weekly; felt like symptoms improved during early adulthood but has been feeling more congested recently and is wondering whether she needs to return to allergist; has not established with anyone in [facility] [place]. Patient Active Problem List   Diagnosis Code    Anxiety F41.9    Vitamin D deficiency E55.9    Hyperthyroidism E05.90    Environmental allergies Z91.09    Asthma J45.909    Headache(784.0) R51    Family history of ovarian cancer Z80.41    Breast lump on right side at 1 o'clock position N63.12    Large breasts N62    Severe obesity (BMI 35.0-39. 9) E66.01    Attention deficit hyperactivity disorder (ADHD), combined type F90.2     Past Surgical History:   Procedure Laterality Date    HX HEENT      wisdom teeth extraction    HX KNEE ARTHROSCOPY  2003    right     Social History     Socioeconomic History    Marital status:      Spouse name: Not on file    Number of children: 0    Years of education: Not on file    Highest education level: Not on file   Social Needs    Financial resource strain: Not on file    Food insecurity - worry: Not on file    Food insecurity - inability: Not on file    Transportation needs - medical: Not on file   [employer] needs - non-medical: Not on file   Occupational History    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 Employer: [employer]   Tobacco Use    Smoking status: Never Smoker    Smokeless tobacco: Never Used   Substance and Sexual Activity    Alcohol use: Yes     Comment: 3-4 per month    Drug use: No    Sexual activity: Yes     Partners: Male     Birth control/protection: Inserts   Other Topics Concern    Not on file   Social History Narrative    Not on file     Family History   Problem Relation Age of Onset   Thaddeus Charsamy Cancer Mother         cervical    Diabetes Mother     COPD Mother    Thaddeus Charm Stroke Mother     Hypertension Mother     Diabetes Father     Hypertension Father     Stroke Father     Thyroid Disease Sister     Obesity Sister     Thyroid Disease Sister     Alzheimer Maternal Grandmother     COPD Maternal Grandfather     Clotting Disorder Maternal Grandfather         multiple DVTs    Alzheimer Paternal Grandmother     Alcohol abuse Paternal Grandfather     Hypertension Paternal Grandfather      Current Outpatient Medications   Medication Sig    fexofenadine/pseudoephedrine (ALLEGRA-D 12 HOUR PO) Take  by mouth.  predniSONE (DELTASONE) 20 mg tablet Take 2 tabs daily x 3 days then 1 tab daily x 2 days    doxycycline (ADOXA) 100 mg tablet Take 1 Tab by mouth two (2) times a day.  promethazine-codeine (PHENERGAN WITH CODEINE) 6.25-10 mg/5 mL syrup Take 5 mL by mouth four (4) times daily as needed for Cough. Max Daily Amount: 20 mL.  montelukast (SINGULAIR) 10 mg tablet Take 1 Tab by mouth daily.  escitalopram oxalate (LEXAPRO) 10 mg tablet TAKE 1 TABLET BY MOUTH DAILY.  amphetamine-dextroamphetamine XR (ADDERALL XR) 10 mg XR capsule Take 1 Cap (10 mg total) by mouth every morning. 90 day supply.     multivitamin, tx-iron-ca-min (THERA-M W/ IRON) 9 mg iron-400 mcg tab tablet Take 1 Tab by mouth daily.  ethinyl estradiol-etonogestrel (NUVARING) 0.12-0.015 mg/24 hr vaginal ring Per month: Insert vaginally, leave in for three weeks, remove for one week    albuterol (PROAIR HFA) 90 mcg/actuation inhaler Take 1 Puff by inhalation every six (6) hours as needed for Wheezing or Shortness of Breath.  cholecalciferol, VITAMIN D3, (VITAMIN D3) 5,000 unit tab tablet Take 1 Tab by mouth daily.  cetirizine (ZYRTEC) 10 mg tablet Take 10 mg by mouth daily.  methimazole (TAPAZOLE) 5 mg tablet Take 1 Tab by mouth daily. (Patient taking differently: Take 2.5 mg by mouth daily. Daily)    diphenhydrAMINE (BENADRYL) 25 mg capsule Take 25 mg by mouth every six (6) hours as needed. No current facility-administered medications for this visit. Allergies   Allergen Reactions    Azithromycin Diarrhea     bloated    Wellbutrin [Bupropion Hcl] Other (comments)     irritability     Immunization History   Administered Date(s) Administered    Influenza Vaccine 09/30/2013, 10/26/2018    Influenza Vaccine (Quad) 11/16/2015    Pneumococcal Polysaccharide (PPSV-23) 09/20/2018    Tdap 02/22/2016          Review of Systems   Constitutional: Positive for malaise/fatigue. Negative for chills and fever. HENT: Positive for congestion. Negative for sore throat. Respiratory: Positive for cough, shortness of breath and wheezing. Negative for sputum production. Cardiovascular: Negative for chest pain and palpitations. Gastrointestinal: Negative for nausea and vomiting. Musculoskeletal: Negative for myalgias. Skin: Negative for rash. Neurological: Positive for headaches. Negative for dizziness.        /87 (BP 1 Location: Left arm, BP Patient Position: Sitting)   Pulse 91   Temp 98.4 °F (36.9 °C) (Oral)   Resp 12   Ht 5' 5\" (1.651 m)   Wt 226 lb 6.4 oz (102.7 kg)   SpO2 99%   BMI 37.67 kg/m²   Physical Exam   Constitutional: She is oriented to person, place, and time. She appears well-developed and well-nourished. HENT:   Head: Normocephalic and atraumatic. Right Ear: External ear normal.   Left Ear: External ear normal.   Nose: Mucosal edema present. Right sinus exhibits no maxillary sinus tenderness and no frontal sinus tenderness. Left sinus exhibits no maxillary sinus tenderness and no frontal sinus tenderness. Mouth/Throat: No posterior oropharyngeal erythema. Neck: Normal range of motion. Neck supple. No thyromegaly present. Cardiovascular: Normal rate and regular rhythm. Pulmonary/Chest: Effort normal. She has wheezes (diffuse wheezing throughout). She has no rales. Harsh cough   Musculoskeletal: Normal range of motion. Lymphadenopathy:     She has no cervical adenopathy. Neurological: She is alert and oriented to person, place, and time. Skin: Skin is warm and dry. Psychiatric: She has a normal mood and affect. Her behavior is normal.   Nursing note and vitals reviewed. ASSESSMENT and PLAN  Diagnoses and all orders for this visit:    1. Non-seasonal allergic rhinitis, unspecified trigger -- trial of Singulair for allergies and asthma  -     REFERRAL TO ALLERGY  -     montelukast (SINGULAIR) 10 mg tablet; Take 1 Tab by mouth daily. 2. Mild intermittent asthma with exacerbation -- currently flaring; does not appear to have infection at this point but given printed Rx for Doxycycline to fill and start if she begins to have more productive cough with purulent mucous. -     predniSONE (DELTASONE) 20 mg tablet; Take 2 tabs daily x 3 days then 1 tab daily x 2 days  -     doxycycline (ADOXA) 100 mg tablet; Take 1 Tab by mouth two (2) times a day. -     promethazine-codeine (PHENERGAN WITH CODEINE) 6.25-10 mg/5 mL syrup; Take 5 mL by mouth four (4) times daily as needed for Cough. Max Daily Amount: 20 mL. -     REFERRAL TO ALLERGY  -     montelukast (SINGULAIR) 10 mg tablet; Take 1 Tab by mouth daily.       lab results and schedule of future lab studies reviewed with patient  reviewed diet, exercise and weight control  reviewed medications and side effects in detail

## 2018-10-30 NOTE — PATIENT INSTRUCTIONS
Learning About Asthma Triggers  What are triggers? When you have asthma, certain things can make your symptoms worse. These are called triggers. They include:  · Cigarette smoke or air pollution. · Things you are allergic to, such as:  ¨ Pollen, mold, or dust mites. ¨ Pet hair, skin, or saliva. · Illnesses, like colds, flu, or pneumonia. · Exercise. · Dry, cold air. How do triggers affect asthma? Triggers can make it harder for your lungs to work as they should and can lead to sudden difficulty breathing and other symptoms. When you are around a trigger, an asthma attack is more likely. If your symptoms are severe, you may need emergency treatment or have to go to the hospital for treatment. If you know what your triggers are and can avoid them, you may be able to prevent asthma attacks, reduce how often you have them, and make them less severe. What can you do to avoid triggers? The first thing is to know your triggers. When you are having symptoms, note the things around you that might be causing them. Then look for patterns in what may be triggering your symptoms. Record your triggers on a piece of paper or in an asthma diary. When you have your list of possible triggers, work with your doctor to find ways to avoid them. You also can check how well your lungs are working by measuring your peak expiratory flow (PEF) throughout the day. Your PEF may drop when you are near things that trigger symptoms. Here are some ways to avoid a few common triggers. · Do not smoke or allow others to smoke around you. If you need help quitting, talk to your doctor about stop-smoking programs and medicines. These can increase your chances of quitting for good. · If there is a lot of pollution, pollen, or dust outside, stay at home and keep your windows closed. Use an air conditioner or air filter in your home. Check your local weather report or newspaper for air quality and pollen reports.   · Get a flu shot every year. Talk to your doctor about getting a pneumococcal shot. Wash your hands often to prevent infections. · Avoid exercising outdoors in cold weather. If you are outdoors in cold weather, wear a scarf around your face and breathe through your nose. How can you manage an asthma attack? · If you have an asthma action plan, follow the plan. In general:  ¨ Use your quick-relief inhaler as directed by your doctor. If your symptoms do not get better after you use your medicine, have someone take you to the emergency room. Call an ambulance if needed. ¨ If your doctor has given you other inhaled medicines or steroid pills, take them as directed. Where can you learn more? Go to [employer]  Enter M564 in the search box to learn more about \"Learning About Asthma Triggers. \"   © 9962-2467 Healthwise, Incorporated. Care instructions adapted under license by [employer] (which disclaims liability or warranty for this information). This care instruction is for use with your licensed healthcare professional. If you have questions about a medical condition or this instruction, always ask your healthcare professional. [place] [address] any warranty or liability for your use of this information. Content Version: 73.7.994275;  Last Revised: February 23, 2012

## 2019-01-03 ENCOUNTER — PATIENT MESSAGE (OUTPATIENT)
Dept: INTERNAL MEDICINE CLINIC | Age: 44
End: 2019-01-03

## 2019-01-03 DIAGNOSIS — F90.2 ATTENTION DEFICIT HYPERACTIVITY DISORDER (ADHD), COMBINED TYPE: ICD-10-CM

## 2019-01-03 RX ORDER — DEXTROAMPHETAMINE SACCHARATE, AMPHETAMINE ASPARTATE MONOHYDRATE, DEXTROAMPHETAMINE SULFATE AND AMPHETAMINE SULFATE 2.5; 2.5; 2.5; 2.5 MG/1; MG/1; MG/1; MG/1
10 CAPSULE, EXTENDED RELEASE ORAL
Qty: 90 CAP | Refills: 0 | Status: SHIPPED | OUTPATIENT
Start: 2019-01-03 | End: 2019-03-23 | Stop reason: SDUPTHER

## 2019-01-11 ENCOUNTER — DOCUMENTATION ONLY (OUTPATIENT)
Dept: INTERNAL MEDICINE CLINIC | Age: 44
End: 2019-01-11

## 2019-02-19 RX ORDER — ESCITALOPRAM OXALATE 10 MG/1
TABLET ORAL
Qty: 90 TAB | Refills: 1 | Status: SHIPPED | OUTPATIENT
Start: 2019-02-19 | End: 2019-04-24

## 2019-02-27 RX ORDER — MONTELUKAST SODIUM 10 MG/1
TABLET ORAL
Qty: 30 TAB | Refills: 3 | Status: SHIPPED | OUTPATIENT
Start: 2019-02-27 | End: 2019-05-21 | Stop reason: SDUPTHER

## 2019-03-23 DIAGNOSIS — F90.2 ATTENTION DEFICIT HYPERACTIVITY DISORDER (ADHD), COMBINED TYPE: ICD-10-CM

## 2019-03-24 RX ORDER — DEXTROAMPHETAMINE SACCHARATE, AMPHETAMINE ASPARTATE MONOHYDRATE, DEXTROAMPHETAMINE SULFATE AND AMPHETAMINE SULFATE 2.5; 2.5; 2.5; 2.5 MG/1; MG/1; MG/1; MG/1
10 CAPSULE, EXTENDED RELEASE ORAL
Qty: 90 CAP | Refills: 0 | Status: SHIPPED | OUTPATIENT
Start: 2019-03-24 | End: 2019-04-24 | Stop reason: SDUPTHER

## 2019-03-24 RX ORDER — ALBUTEROL SULFATE 90 UG/1
1 AEROSOL, METERED RESPIRATORY (INHALATION)
Qty: 3 INHALER | Refills: 3 | Status: SHIPPED | OUTPATIENT
Start: 2019-03-24 | End: 2020-11-10 | Stop reason: SDUPTHER

## 2019-04-03 ENCOUNTER — DOCUMENTATION ONLY (OUTPATIENT)
Dept: INTERNAL MEDICINE CLINIC | Age: 44
End: 2019-04-03

## 2019-04-24 ENCOUNTER — OFFICE VISIT (OUTPATIENT)
Dept: INTERNAL MEDICINE CLINIC | Age: 44
End: 2019-04-24

## 2019-04-24 VITALS
DIASTOLIC BLOOD PRESSURE: 84 MMHG | HEART RATE: 80 BPM | RESPIRATION RATE: 18 BRPM | SYSTOLIC BLOOD PRESSURE: 121 MMHG | WEIGHT: 221.13 LBS | HEIGHT: 65 IN | OXYGEN SATURATION: 98 % | TEMPERATURE: 98.3 F | BODY MASS INDEX: 36.84 KG/M2

## 2019-04-24 DIAGNOSIS — F41.9 ANXIETY: ICD-10-CM

## 2019-04-24 DIAGNOSIS — F90.2 ATTENTION DEFICIT HYPERACTIVITY DISORDER (ADHD), COMBINED TYPE: Primary | ICD-10-CM

## 2019-04-24 RX ORDER — DEXTROAMPHETAMINE SACCHARATE, AMPHETAMINE ASPARTATE MONOHYDRATE, DEXTROAMPHETAMINE SULFATE AND AMPHETAMINE SULFATE 2.5; 2.5; 2.5; 2.5 MG/1; MG/1; MG/1; MG/1
10 CAPSULE, EXTENDED RELEASE ORAL
Qty: 90 CAP | Refills: 0 | Status: SHIPPED | OUTPATIENT
Start: 2019-04-24 | End: 2019-10-08 | Stop reason: SDUPTHER

## 2019-04-24 RX ORDER — ESCITALOPRAM OXALATE 5 MG/1
5 TABLET ORAL DAILY
Qty: 90 TAB | Refills: 0 | Status: SHIPPED | OUTPATIENT
Start: 2019-04-24 | End: 2019-07-15 | Stop reason: SDUPTHER

## 2019-04-24 NOTE — PROGRESS NOTES
HISTORY OF PRESENT ILLNESS    Chief Complaint   Patient presents with    Hyperactivity    Medication Refill     Requesting 3 month supply Adderall       Presents for follow-up  Ran 10K. 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 Seeing Dr. Noemi Rodriguez for thyroid. Attention Deficit Disorder follow-up  Taking adderall XL 25 mg daily  Problems with moodiness, appetite, weight loss, anxiety, depression, or sleep? no  Any complaints about taking the medication(s)? no    Anxiety  Patient is seen for anxiety disorder. Current treatment includes Lexapro and no other therapies. Ongoing symptoms include: none. Patient denies: sweating, chest pain, dizziness, paresthesias, racing thoughts, feelings of losing control, difficulty concentrating, suicidal ideation. Denies substance or alcohol abuse. Reported side effects from the treatment: none   She feels well and wants to wean off lexapro. She does not want to depend on it  wellbutrin did not work. .    Review of Systems   All other systems reviewed and are negative, except as noted in HPI    Past Medical and Surgical History   has a past medical history of ADHD (attention deficit hyperactivity disorder) (2008), Allergy to mold spores, Anxiety (3/2012), Asthma, Breast nodule (3/2014), Environmental allergies, Family history of skin cancer, Graves disease (2012), H/O mammogram (05/11/2017; 7/31/18), WAGNER (obstructive sleep apnea), and Vitamin D deficiency. She also has no past medical history of Arsenic suspected exposure, Radiation exposure, Skin cancer, Sun-damaged skin, Sunburn, blistering, or Tanning bed exposure. has a past surgical history that includes hx knee arthroscopy (2003) and hx heent. reports that she has never smoked. She has never used smokeless tobacco. She reports that she drinks alcohol. She reports that she does not use drugs.   family history includes Alcohol abuse in her paternal grandfather; Alzheimer in her maternal grandmother and paternal grandmother; COPD in her maternal grandfather and mother; Cancer in her mother; Clotting Disorder in her maternal grandfather; Diabetes in her father and mother; Hypertension in her father, mother, and paternal grandfather; Obesity in her sister; Stroke in her father and mother; Thyroid Disease in her sister and sister. Physical Exam   Nursing note and vitals reviewed. Blood pressure 121/84, pulse 80, temperature 98.3 °F (36.8 °C), temperature source Oral, resp. rate 18, height 5' 5\" (1.651 m), weight 221 lb 2 oz (100.3 kg), SpO2 98 %. Constitutional:  No distress. Eyes: Conjunctivae are normal.   Ears:  Hearing grossly intact  Cardiovascular: Normal rate. regular rhythm, no murmurs or gallops  No edema  Pulmonary/Chest: Effort normal.   CTAB  Musculoskeletal: moves all 4 extremities   Neurological: Alert and oriented to person, place, and time. Skin: No rash noted. Psychiatric: Normal mood and affect. Behavior is normal.     ASSESSMENT and PLAN  Diagnoses and all orders for this visit:    1. Attention deficit hyperactivity disorder (ADHD), combined type  Controlled on current regimen. Continue current medications as written in chart  -     amphetamine-dextroamphetamine XR (ADDERALL XR) 10 mg XR capsule; Take 1 Cap by mouth every morning. 90 day supply. 2. Anxiety  Doing well. Can wean to 5 mg, then take every other day for 2-4 weeks, then stop  Consider SNRI or buspar of she does not SSRI prn, but zoloft, prozac also are good options since risk  Is low.    -     escitalopram oxalate (LEXAPRO) 5 mg tablet; Take 1 Tab by mouth daily.  Decreased dose 4/24/19          lab results and schedule of future lab studies reviewed with patient  reviewed medications and side effects in detail    Return to clinic for further evaluation if new symptoms develop        Current Outpatient Medications   Medication Sig    mometasone-formoterol (DULERA) 100-5 mcg/actuation HFA inhaler Take 2 Puffs by inhalation as needed.  amphetamine-dextroamphetamine XR (ADDERALL XR) 10 mg XR capsule Take 1 Cap by mouth every morning. 90 day supply.  escitalopram oxalate (LEXAPRO) 5 mg tablet Take 1 Tab by mouth daily. Decreased dose 4/24/19    albuterol (PROAIR HFA) 90 mcg/actuation inhaler Take 1 Puff by inhalation every six (6) hours as needed for Wheezing or Shortness of Breath.  montelukast (SINGULAIR) 10 mg tablet TAKE 1 TABLET BY MOUTH ONCE DAILY    fexofenadine/pseudoephedrine (ALLEGRA-D 12 HOUR PO) Take  by mouth.  multivitamin, tx-iron-ca-min (THERA-M W/ IRON) 9 mg iron-400 mcg tab tablet Take 1 Tab by mouth daily.  ethinyl estradiol-etonogestrel (NUVARING) 0.12-0.015 mg/24 hr vaginal ring Per month: Insert vaginally, leave in for three weeks, remove for one week    diphenhydrAMINE (BENADRYL) 25 mg capsule Take 25 mg by mouth every six (6) hours as needed.  cholecalciferol, VITAMIN D3, (VITAMIN D3) 5,000 unit tab tablet Take 1 Tab by mouth daily.  cetirizine (ZYRTEC) 10 mg tablet Take 10 mg by mouth daily.  methimazole (TAPAZOLE) 5 mg tablet Take 1 Tab by mouth daily. (Patient taking differently: Take 2.5 mg by mouth daily. Daily)     No current facility-administered medications for this visit. Discussed the patient's BMI with her. The BMI follow up plan is as follows:     dietary management education, guidance, and counseling  encourage exercise  monitor weight  prescribed dietary intake    An After Visit Summary was printed and given to the patient.

## 2019-04-24 NOTE — PATIENT INSTRUCTIONS
Body Mass Index: Care Instructions Your Care Instructions Body mass index (BMI) can help you see if your weight is raising your risk for health problems. It uses a formula to compare how much you weigh with how tall you are. · A BMI lower than 18.5 is considered underweight. · A BMI between 18.5 and 24.9 is considered healthy. · A BMI between 25 and 29.9 is considered overweight. A BMI of 30 or higher is considered obese. If your BMI is in the normal range, it means that you have a lower risk for weight-related health problems. If your BMI is in the overweight or obese range, you may be at increased risk for weight-related health problems, such as high blood pressure, heart disease, stroke, arthritis or joint pain, and diabetes. If your BMI is in the underweight range, you may be at increased risk for health problems such as fatigue, lower protection (immunity) against illness, muscle loss, bone loss, hair loss, and hormone problems. BMI is just one measure of your risk for weight-related health problems. You may be at higher risk for health problems if you are not active, you eat an unhealthy diet, or you drink too much alcohol or use tobacco products. Follow-up care is a key part of your treatment and safety. Be sure to make and go to all appointments, and call your doctor if you are having problems. It's also a good idea to know your test results and keep a list of the medicines you take. How can you care for yourself at home? · Practice healthy eating habits. This includes eating plenty of fruits, vegetables, whole grains, lean protein, and low-fat dairy. · If your doctor recommends it, get more exercise. Walking is a good choice. Bit by bit, increase the amount you walk every day. Try for at least 30 minutes on most days of the week. · Do not smoke. Smoking can increase your risk for health problems.  If you need help quitting, talk to your doctor about stop-smoking programs and medicines. These can increase your chances of quitting for good. · Limit alcohol to 2 drinks a day for men and 1 drink a day for women. Too much alcohol can cause health problems. If you have a BMI higher than 25 · Your doctor may do other tests to check your risk for weight-related health problems. This may include measuring the distance around your waist. A waist measurement of more than 40 inches in men or 35 inches in women can increase the risk of weight-related health problems. · Talk with your doctor about steps you can take to stay healthy or improve your health. You may need to make lifestyle changes to lose weight and stay healthy, such as changing your diet and getting regular exercise. If you have a BMI lower than 18.5 · Your doctor may do other tests to check your risk for health problems. · Talk with your doctor about steps you can take to stay healthy or improve your health. You may need to make lifestyle changes to gain or maintain weight and stay healthy, such as getting more healthy foods in your diet and doing exercises to build muscle. Where can you learn more? Go to http://em-yenny.info/. Enter S176 in the search box to learn more about \"Body Mass Index: Care Instructions. \" Current as of: October 13, 2016 Content Version: 11.4 © 6138-1624 Healthwise, Incorporated. Care instructions adapted under license by [employer] (which disclaims liability or warranty for this information). If you have questions about a medical condition or this instruction, always ask your healthcare professional. [address] any warranty or liability for your use of this information.

## 2019-06-11 ENCOUNTER — OFFICE VISIT (OUTPATIENT)
Dept: OBGYN CLINIC | Age: 44
End: 2019-06-11

## 2019-06-11 VITALS
BODY MASS INDEX: 36.65 KG/M2 | HEART RATE: 95 BPM | SYSTOLIC BLOOD PRESSURE: 115 MMHG | HEIGHT: 65 IN | WEIGHT: 220 LBS | DIASTOLIC BLOOD PRESSURE: 70 MMHG

## 2019-06-11 DIAGNOSIS — Z12.4 ENCOUNTER FOR SCREENING FOR CERVICAL CANCER: ICD-10-CM

## 2019-06-11 DIAGNOSIS — Z01.419 ENCOUNTER FOR GYNECOLOGICAL EXAMINATION (GENERAL) (ROUTINE) WITHOUT ABNORMAL FINDINGS: Primary | ICD-10-CM

## 2019-06-11 RX ORDER — ETONOGESTREL AND ETHINYL ESTRADIOL 11.7; 2.7 MG/1; MG/1
INSERT, EXTENDED RELEASE VAGINAL
Qty: 3 DEVICE | Refills: 4 | Status: SHIPPED | OUTPATIENT
Start: 2019-06-11 | End: 2020-02-14 | Stop reason: SDUPTHER

## 2019-06-11 NOTE — PROGRESS NOTES
[facility]  [email or web address]  [phone]    Dontae Calderon MD, [address] [facility]       Annual Gynecologic Exam  [facility] 40-60  Chief Complaint   Patient presents with    Well Woman       Akhil Elise Rash is a 38 y.o. No obstetric history on file. WH[facility] OR   female who presents for an annual exam.  Patient's last menstrual period was 05/31/2019 (exact date). .    She does not report additional concerns today. Menstrual status:  Her periods are light. She does report dysmenorrhea/painful menses. She does not report irregular bleeding. Sexual history and Contraception:  Social History     Substance and Sexual Activity   Sexual Activity Yes    Partners: Male    Birth control/protection: Inserts       She does not reports new sexual partner(s) in the last year. The patient does not request STD testing. Preventive Medicine History:  Her most recent Pap smear result: normal was obtained in January 2014    Her most recent HR HPV screen was Negative obtained in 2014    She does not have a history of KENZIE 2, 3 or cervical cancer. Breast health:  Last mammogram: was normal. 7/31/18  A mammogram was not scheduled for today. Scheduled for 8/2019  Breast cancer family updated: see . Bone health: Candance Huger She does not have a history of osteopenia/osteoporosis. Osteoporosis family history updated: see . Past Medical History:   Diagnosis Date    ADHD (attention deficit hyperactivity disorder) 2008    adult. stopped meds 2015, doing well    Allergy to mold spores     Anxiety 3/2012    first rx age 33 5    Asthma     mild.  Breast nodule 3/2014    R 12:00.  dense tissue. Dr. 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 Dr. Pema Portillo H/O mammogram 05/11/2017; 7/31/18    Negative; Negative    WAGNER (obstructive sleep apnea)     mild. Dr. Jordan Betancourt. dental appliance.  Vitamin D deficiency      OB History   Obstetric Comments   Menarche:  16. LMP: 1/2/14. # of Children:  0. Age at Delivery of First Child:  n/a. Hysterectomy/oophorectomy:  NO/NO. Breast Bx:  no.  Hx of Breast Feeding:  no. BCP:  yes. Hormone therapy:  no.       Past Surgical History:   Procedure Laterality Date    HX HEENT      wisdom teeth extraction    HX KNEE ARTHROSCOPY  2003    right     Family History   Problem Relation Age of Onset   [facility] Cancer Mother         cervical    Diabetes Mother    [facility] COPD Mother    [facility] Stroke Mother     Hypertension Mother    [facility] Diabetes Father     Hypertension Father     Stroke Father     Thyroid Disease Sister     Obesity Sister     Thyroid Disease Sister     Alzheimer Maternal Grandmother     COPD Maternal Grandfather     Clotting Disorder Maternal Grandfather         multiple DVTs    Alzheimer Paternal Grandmother     Alcohol abuse Paternal Grandfather     Hypertension Paternal Grandfather      Social History     Socioeconomic History    Marital status:      Spouse name: Not on file    Number of children: 0    Years of education: Not on file    Highest education level: Not on file   Occupational History    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       Employer: [employer]    Financial resource strain: Not on file    Food insecurity:     Worry: Not on file     Inability: Not on file    Transportation needs:     Medical: Not on file     Non-medical: Not on file   Tobacco Use    Smoking status: Never Smoker    Smokeless tobacco: Never Used   Substance and Sexual Activity    Alcohol use: Yes     Comment: 3-4 per month    Drug use: No    Sexual activity: Yes     Partners: Male     Birth control/protection: Inserts   Lifestyle    Physical activity:     Days per week: Not on file     Minutes per session: Not on file    Stress: Not on file   Relationships    Social connections:     Talks on phone: Not on file     Gets together: Not on file     Attends Buddhist service: Not on file     Active member of club or organization: Not on file     Attends meetings of clubs or organizations: Not on file     Relationship status: Not on file    Intimate partner violence:     Fear of current or ex partner: Not on file     Emotionally abused: Not on file     Physically abused: Not on file     Forced sexual activity: Not on file   Other Topics Concern    Not on file   Social History Narrative    Not on file       Allergies   Allergen Reactions    Azithromycin Diarrhea     bloated  bloated    Clotrimazole-Betamethasone Other (comments)     Atrophy of the skin    Wellbutrin [Bupropion Hcl] Other (comments)     irritability       Current Outpatient Medications   Medication Sig    ethinyl estradiol-etonogestrel (NUVARING) 0.12-0.015 mg/24 hr vaginal ring Per month: Insert vaginally, leave in for three weeks, remove for one week    montelukast (SINGULAIR) 10 mg tablet TAKE 1 TABLET BY MOUTH ONCE DAILY    mometasone-formoterol (DULERA) 100-5 mcg/actuation HFA inhaler Take 2 Puffs by inhalation as needed.  amphetamine-dextroamphetamine XR (ADDERALL XR) 10 mg XR capsule Take 1 Cap by mouth every morning. 90 day supply.  escitalopram oxalate (LEXAPRO) 5 mg tablet Take 1 Tab by mouth daily. Decreased dose 4/24/19    albuterol (PROAIR HFA) 90 mcg/actuation inhaler Take 1 Puff by inhalation every six (6) hours as needed for Wheezing or Shortness of Breath.  multivitamin, tx-iron-ca-min (THERA-M W/ IRON) 9 mg iron-400 mcg tab tablet Take 1 Tab by mouth daily.  diphenhydrAMINE (BENADRYL) 25 mg capsule Take 25 mg by mouth every six (6) hours as needed.  cholecalciferol, VITAMIN D3, (VITAMIN D3) 5,000 unit tab tablet Take 1 Tab by mouth daily.  cetirizine (ZYRTEC) 10 mg tablet Take 10 mg by mouth daily.  methimazole (TAPAZOLE) 5 mg tablet Take 1 Tab by mouth daily.  (Patient taking differently: Take 2.5 mg by mouth daily. Daily)    fexofenadine/pseudoephedrine (ALLEGRA-D 12 HOUR PO) Take  by mouth.  ethinyl estradiol-etonogestrel (NUVARING) 0.12-0.015 mg/24 hr vaginal ring Per month: Insert vaginally, leave in for three weeks, remove for one week     No current facility-administered medications for this visit. Patient Active Problem List   Diagnosis Code    Anxiety F41.9    Vitamin D deficiency E55.9    Hyperthyroidism E05.90    Environmental allergies Z91.09    Asthma J45.909    Headache(784.0) R51    Family history of ovarian cancer Z80.41    Breast lump on right side at 1 o'clock position N63.12    Large breasts N62    Severe obesity (BMI 35.0-39. 9) E66.01    Attention deficit hyperactivity disorder (ADHD), combined type F90.2       Review of Systems - History obtained from the patient  Constitutional: negative for weight loss, fever, night sweats  HEENT: negative for hearing loss, earache, congestion, snoring, sorethroat  CV: negative for chest pain, palpitations, edema  Resp: negative for cough, shortness of breath, wheezing  GI: negative for change in bowel habits, abdominal pain, black or bloody stools  : negative for frequency, dysuria, hematuria, vaginal discharge  MSK: negative for back pain, joint pain, muscle pain  Breast: negative for breast lumps, nipple discharge, galactorrhea  Skin :negative for itching, rash, hives  Neuro: negative for dizziness, headache, confusion, weakness  Psych: negative for anxiety, depression, change in mood  Heme/lymph: negative for bleeding, bruising, pallor    Physical Exam  Visit Vitals  /70   Pulse 95   Ht 5' 5\" (1.651 m)   Wt 220 lb (99.8 kg)   LMP 05/31/2019 (Exact Date)   BMI 36.61 kg/m²       Constitutional  · Appearance: well-nourished, well developed, alert, in no acute distress    HENT  · Head and Face: appears normal    Neck  · Inspection/Palpation: normal appearance, no masses or tenderness  · Lymph Nodes: no lymphadenopathy present  · Thyroid: gland size normal, nontender, no nodules or masses present on palpation    Chest  · Respiratory Effort: breathing unlabored  · Auscultation: normal breath sounds    Cardiovascular  · Heart:  · Auscultation: regular rate and rhythm without murmur    Breasts  · Inspection of Breasts: breasts symmetrical, no skin changes, no discharge present, nipple appearance normal, no skin retraction present  · Palpation of Breasts and Axillae: no masses present on palpation, no breast tenderness  · Axillary Lymph Nodes: no lymphadenopathy present    Gastrointestinal  · Abdominal Examination: abdomen non-tender to palpation, normal bowel sounds, no masses present  · Liver and spleen: no hepatomegaly present, spleen not palpable  · Hernias: no hernias identified    Genitourinary  · External Genitalia: normal appearance for age, no discharge present, no tenderness present, no inflammatory lesions present, no masses present, without atrophy present  · Vagina: normal vaginal vault without central or paravaginal defects, no discharge present, no inflammatory lesions present, no masses present, ring in place  · Bladder: non-tender to palpation  · Urethra: appears normal  · Cervix: normal   · Uterus: normal size, shape and consistency  · Adnexa: no adnexal tenderness present, no adnexal masses present  · Perineum: perineum within normal limits, no evidence of trauma, no rashes or skin lesions present  · Anus: anus within normal limits, no hemorrhoids present  · Inguinal Lymph Nodes: no lymphadenopathy present    Skin  · General Inspection: no rash, no lesions identified    Neurologic/Psychiatric  · Mental Status:  · Orientation: grossly oriented to person, place and time  · Mood and Affect: mood normal, affect appropriate    Assessment:  38 y.o. No obstetric history on file. for well woman exam  Encounter Diagnoses   Name Primary?     Encounter for screening for cervical cancer      Encounter for gynecological examination (general) (routine) without abnormal findings Yes       Plan:  The patient was counseled about diet, exercise, healthy lifestyle  We discussed current self breast exam and mammogram recommendations  We discussed current pap smear and HR HPV testing guidelines  We recommend follow up in one year for routine annual gynecologic exam or sooner if needed  We recommend follow up with a primary care physician for any chronic medical problems or non-gynecologic concerns    We discussed calcium/vitamin D/weight bearing exercise and osteoporosis prevention  Handouts were given to the patient  Discussed risks, benefits and alternatives of OCP/nuvaring/patch: including but not limited to dvt/pe/mi/cva/ca/gi risks and that smoking, increasing age and other health conditions can increase these risks. Folllow up:  [x] return for annual well woman exam in one year or sooner if she is having problems  [] follow up and ultrasound  [x] mammogram  [] 6 months  [] 3 months  [] 1 month    Orders Placed This Encounter    ethinyl estradiol-etonogestrel (NUVARING) 0.12-0.015 mg/24 hr vaginal ring    PAP IG, HPV AND RFX HPV 40/88,12([id number])       No results found for any visits on 06/11/19.

## 2019-06-11 NOTE — PATIENT INSTRUCTIONS

## 2019-06-13 LAB
CYTOLOGIST CVX/VAG CYTO: NORMAL
CYTOLOGY CVX/VAG DOC CYTO: NORMAL
CYTOLOGY CVX/VAG DOC THIN PREP: NORMAL
CYTOLOGY HISTORY:: NORMAL
DX ICD CODE: NORMAL
HPV I/H RISK 1 DNA CVX QL PROBE+SIG AMP: NEGATIVE
Lab: NORMAL
OTHER STN SPEC: NORMAL
STAT OF ADQ CVX/VAG CYTO-IMP: NORMAL

## 2019-07-09 ENCOUNTER — OFFICE VISIT (OUTPATIENT)
Dept: INTERNAL MEDICINE CLINIC | Age: 44
End: 2019-07-09

## 2019-07-09 VITALS
DIASTOLIC BLOOD PRESSURE: 87 MMHG | BODY MASS INDEX: 36.65 KG/M2 | TEMPERATURE: 98.8 F | WEIGHT: 220 LBS | RESPIRATION RATE: 18 BRPM | SYSTOLIC BLOOD PRESSURE: 125 MMHG | HEIGHT: 65 IN | HEART RATE: 76 BPM | OXYGEN SATURATION: 97 %

## 2019-07-09 DIAGNOSIS — B37.89 CANDIDIASIS OF BREAST: ICD-10-CM

## 2019-07-09 DIAGNOSIS — B07.0 PLANTAR WARTS: Primary | ICD-10-CM

## 2019-07-09 DIAGNOSIS — L71.0 PERIORAL DERMATITIS: ICD-10-CM

## 2019-07-09 RX ORDER — BUTENAFINE HYDROCHLORIDE 10 MG/G
CREAM TOPICAL DAILY
Qty: 30 G | Refills: 1 | Status: SHIPPED | OUTPATIENT
Start: 2019-07-09 | End: 2020-02-14 | Stop reason: ALTCHOICE

## 2019-07-09 RX ORDER — KETOCONAZOLE 20 MG/G
CREAM TOPICAL AS NEEDED
COMMUNITY
End: 2020-02-14 | Stop reason: ALTCHOICE

## 2019-07-09 RX ORDER — IMIQUIMOD 12.5 MG/.25G
CREAM TOPICAL
Qty: 24 PACKET | Refills: 2 | Status: SHIPPED | OUTPATIENT
Start: 2019-07-09 | End: 2020-02-14 | Stop reason: ALTCHOICE

## 2019-07-09 RX ORDER — DOXYCYCLINE 100 MG/1
100 CAPSULE ORAL 2 TIMES DAILY
Qty: 14 CAP | Refills: 0 | Status: SHIPPED | OUTPATIENT
Start: 2019-07-09 | End: 2019-10-17 | Stop reason: ALTCHOICE

## 2019-07-09 NOTE — PATIENT INSTRUCTIONS
Plantar Warts: Care Instructions  Your Care Instructions  A plantar wart is a harmless skin growth. Plantar warts occur on the bottom of your feet and may be painful when you walk. A virus makes the top layer of skin grow quickly, causing a wart. Warts usually go away on their own in months or years. Warts are spread easily. You can infect yourself again by touching the wart and then touching another part of your body. You also can infect others by sharing towels, razors, or other personal items. Most plantar warts do not need treatment. But if warts cause you pain or spread, your doctor may recommend that you use an over-the-counter treatment. These include salicylic acid or duct tape. Your doctor may prescribe a stronger medicine to put on warts or may inject them with medicine. Your doctor also can remove warts through surgery or by freezing them. Follow-up care is a key part of your treatment and safety. Be sure to make and go to all appointments, and call your doctor if you are having problems. It's also a good idea to know your test results and keep a list of the medicines you take. How can you care for yourself at home? · Use salicylic acid or duct tape as your doctor directs. You put the medicine or the tape on a wart for a while and then file down the dead skin on the wart. You use the salicylic acid treatment for 2 to 3 months or the tape for 1 to 2 months. · If your doctor prescribes medicine to put on warts, use it exactly as prescribed. Call your doctor if you think you are having a problem with your medicine. · Wear comfortable shoes and socks. Avoid high heels or shoes that put a lot of pressure on your foot. · Pad the wart with doughnut-shaped felt or a moleskin patch. You can buy these at a drugstore. Put the pad around the plantar wart so that it relieves pressure on the wart. You also can place pads or cushions in your shoes to make walking more comfortable.   · Take an over-the-counter medicine, such as acetaminophen (Tylenol), ibuprofen (Advil, Motrin), or naproxen (Aleve) if you have pain. Read and follow all instructions on the label. · Do not take two or more pain medicines at the same time unless the doctor told you to. Many pain medicines have acetaminophen, which is Tylenol. Too much acetaminophen (Tylenol) can be harmful. When should you call for help? Call your doctor now or seek immediate medical care if:    · You have signs of infection, such as:  ? Increased pain, swelling, warmth, or redness. ? Red streaks leading from a wart. ? Pus draining from a wart. ? A fever.    Watch closely for changes in your health, and be sure to contact your doctor if:    · You do not get better as expected. Where can you learn more? Go to http://emVixloyenny.info/. Enter S429 in the search box to learn more about \"Plantar Warts: Care Instructions. \"  Current as of: April 17, 2018  Content Version: 11.9  © 4827-9636 [employer]. Care instructions adapted under license by [employer] (which disclaims liability or warranty for this information). If you have questions about a medical condition or this instruction, always ask your healthcare professional. [address] any warranty or liability for your use of this information. Candidiasis: Care Instructions  Your Care Instructions  Candidiasis (say \"yej-gsa-DR-uh-harpal\") is a yeast infection. Yeast normally lives in your body. But it can cause problems if your body's defenses don't work as they should. Some medicines can increase your chance of getting a yeast infection. These include antibiotics, steroids, and cancer drugs. And some diseases like AIDS and diabetes can make you more likely to get yeast infections. There are different types of yeast infections. Alex Danese is a yeast infection in the mouth. It usually occurs in people with weak immune systems.  It causes white patches inside the mouth and throat. Yeast infections of the skin usually occur in skin folds where the skin stays moist. They cause red, oozing patches on your skin. Babies can get these infections under the diaper. People who often wear gloves can get them on their hands. Many women get vaginal yeast infections. They are most common when women take antibiotics. These infections can cause the vagina to itch and burn. They also cause white discharge that looks like cottage cheese. In rare cases, yeast infects the blood. This can cause serious disease. This kind of infection is treated with medicine given through a needle into a vein (IV). After you start treatment, a yeast infection usually goes away quickly. But if your immune system is weak, the infection may come back. Tell your doctor if you get yeast infections often. Follow-up care is a key part of your treatment and safety. Be sure to make and go to all appointments, and call your doctor if you are having problems. It's also a good idea to know your test results and keep a list of the medicines you take. How can you care for yourself at home? · Take your medicines exactly as prescribed. Call your doctor if you think you are having a problem with your medicine. · Use antibiotics only as directed by your doctor. · Eat yogurt with live cultures. It has bacteria called lactobacillus. It may help prevent some types of yeast infections. · Keep your skin clean and dry. Put powder on moist places. · If you are using a cream or suppository to treat a vaginal yeast infection, don't use condoms or a diaphragm. Use a different type of birth control. · Eat a healthy diet and get regular exercise. This will help keep your immune system strong. When should you call for help? Watch closely for changes in your health, and be sure to contact your doctor if:    · You do not get better as expected. Where can you learn more?   Go to http://em-yenny.info/. Enter K963 in the search box to learn more about \"Candidiasis: Care Instructions. \"  Current as of: May 14, 2018  Content Version: 11.9  © 2221-2034 [employer]. Care instructions adapted under license by [employer] (which disclaims liability or warranty for this information). If you have questions about a medical condition or this instruction, always ask your healthcare professional. [address] any warranty or liability for your use of this information. Rosacea: Care Instructions  Your Care Instructions  Rosacea (say \"faz-HOT-cres\") is a skin condition that can cause redness, pimples, and red lines on the nose, cheeks, chin, and forehead. It is often mistaken for acne because it can cause outbreaks with bumps like pimples. Rosacea can also cause burning and soreness in your eyes. Rosacea is usually controlled by using medicine and avoiding alcohol, the sun, and other things that can make rosacea worse. Your doctor may have prescribed medicines or other treatment. If antibiotics do not control the rosacea, your doctor may try other medicines. Follow-up care is a key part of your treatment and safety. Be sure to make and go to all appointments, and call your doctor if you are having problems. It's also a good idea to know your test results and keep a list of the medicines you take. How can you care for yourself at home? · Take your medicines exactly as prescribed. Call your doctor if you think you are having a problem with your medicine. · If your doctor prescribed antibiotics, take them as directed. Do not stop taking them just because you feel better. You need to take the full course of antibiotics. · Always wear sunscreen on exposed skin. Make sure to use a broad-spectrum sunscreen that has a sun protection factor (SPF) of 30 or higher. Use it every day, even when it is cloudy.   · Use soaps, lotions, and makeup made for sensitive skin that do not contain alcohol, are not abrasive, and will not clog pores. · Avoid rubbing or scrubbing your face. · Green-based makeup may help mask the redness of an outbreak. Your doctor may be able to refer you to someone who can help you use makeup to cover redness and bumps. · If you have rosacea on your eyelids, put a warm, wet towel, or compress, on your eyes several times a day. Gently wash your eyelids with a washcloth or an eyelid cleanser that is sold in [employer]. Use artificial tears if your eyes feel dry. · Make a list or keep a diary of things that may trigger your rosacea. Use the diary every day for several weeks. Avoid whatever you find that makes your rosacea worse. These triggers may include:  ? Harsh weather. Wear a hat and scarf to shield your face from the cold and wind. Use a moisturizer during the winter to keep your face moist.  ? Stress. Eat a healthy diet and get plenty of exercise and sleep. ? Alcohol, spicy foods, or hot drinks. Avoid or limit these if they make your rosacea worse. ? Getting too hot when you exercise. Try working out for a shorter time. In the summer, exercise during the cool morning hours. ? Hot showers. Take warm or cool showers and avoid hot tubs and saunas. When should you call for help? Watch closely for changes in your health, and be sure to contact your doctor if:    · You do not get better as expected. Where can you learn more? Go to http://em-yenny.info/. Enter E567 in the search box to learn more about \"Rosacea: Care Instructions. \"  Current as of: April 17, 2018  Content Version: 11.9  © 7148-6140 [employer]. Care instructions adapted under license by [employer] (which disclaims liability or warranty for this information).  If you have questions about a medical condition or this instruction, always ask your healthcare professional. [address] any warranty or liability for your use of this information.

## 2019-07-10 NOTE — PROGRESS NOTES
HISTORY OF PRESENT ILLNESS  Kush Fernandez is a 38 y.o. female. HPI  Presents with several issues. Has developed several warts to sole of left foot over the past year. Has been using OTC wart remover liquid without noticeable difference. Has had some discomfort with walking. Complains of reddened bumps to face around nasal folds and mouth that has developed over the past 2 weeks. Has noted some pimple-like lesions. Has applied moisturizing lotion without improvement. Has been having reddened rash under bilateral breasts since weather turned hotter; has been using Nizoral cream without improvement. Keeps area as dry as possible and leaves bra off to let skin air out. Patient Active Problem List   Diagnosis Code    Anxiety F41.9    Vitamin D deficiency E55.9    Hyperthyroidism E05.90    Environmental allergies Z91.09    Asthma J45.909    Headache(784.0) R51    Family history of ovarian cancer Z80.41    Breast lump on right side at 1 o'clock position N63.12    Large breasts N62    Severe obesity (BMI 35.0-39. 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       Employer: [employer]    Financial resource strain: Not on file    Food insecurity:     Worry: Not on file     Inability: Not on file    Transportation needs:     Medical: Not on file     Non-medical: Not on file   Tobacco Use    Smoking status: Never Smoker    Smokeless tobacco: Never Used   Substance and Sexual Activity    Alcohol use: Yes     Comment: 3-4 per month    Drug use: No    Sexual activity: Yes     Partners: Male     Birth control/protection: Inserts   Lifestyle    Physical activity:     Days per week: Not on file     Minutes per session: Not on file    Stress: Not on file   Relationships    Social connections:     Talks on phone: Not on file     Gets together: Not on file     Attends [employer] service: Not on file     Active member of club or organization: Not on file     Attends meetings of clubs or organizations: Not on file     Relationship status: Not on file    Intimate partner violence:     Fear of current or ex partner: Not on file     Emotionally abused: Not on file     Physically abused: Not on file     Forced sexual activity: Not on file   Other Topics Concern    Not on file   Social History Narrative    Not on file     Family History   Problem Relation Age of Onset    Cancer Mother         cervical    Diabetes Mother     COPD Mother     Stroke Mother     Hypertension Mother     Diabetes Father     Hypertension Father     Stroke Father     Thyroid Disease Sister     Obesity Sister     Thyroid Disease Sister     Alzheimer Maternal Grandmother     COPD Maternal Grandfather     Clotting Disorder Maternal Grandfather         multiple DVTs    Alzheimer Paternal Grandmother     Alcohol abuse Paternal Grandfather     Hypertension Paternal Grandfather      Current Outpatient Medications   Medication Sig    ketoconazole (NIZORAL) 2 % topical cream Apply  to affected area daily.  doxycycline (VIBRAMYCIN) 100 mg capsule Take 1 Cap by mouth two (2) times a day.  imiquimod (ALDARA) 5 % cream apply a thin layer as directed to plantar warts once nightly    butenafine (MENTAX) 1 % topical cream Apply  to affected area daily.  montelukast (SINGULAIR) 10 mg tablet TAKE 1 TABLET BY MOUTH ONCE DAILY    mometasone-formoterol (DULERA) 100-5 mcg/actuation HFA inhaler Take 2 Puffs by inhalation as needed.     amphetamine-dextroamphetamine XR (ADDERALL XR) 10 mg XR capsule Take 1 Cap by mouth every morning. 90 day supply.  escitalopram oxalate (LEXAPRO) 5 mg tablet Take 1 Tab by mouth daily. Decreased dose 4/24/19    albuterol (PROAIR HFA) 90 mcg/actuation inhaler Take 1 Puff by inhalation every six (6) hours as needed for Wheezing or Shortness of Breath.  ethinyl estradiol-etonogestrel (NUVARING) 0.12-0.015 mg/24 hr vaginal ring Per month: Insert vaginally, leave in for three weeks, remove for one week    diphenhydrAMINE (BENADRYL) 25 mg capsule Take 25 mg by mouth every six (6) hours as needed.  cholecalciferol, VITAMIN D3, (VITAMIN D3) 5,000 unit tab tablet Take 1 Tab by mouth daily.  cetirizine (ZYRTEC) 10 mg tablet Take 10 mg by mouth daily.  methimazole (TAPAZOLE) 5 mg tablet Take 1 Tab by mouth daily. (Patient taking differently: Take 2.5 mg by mouth daily. Daily)    ethinyl estradiol-etonogestrel (NUVARING) 0.12-0.015 mg/24 hr vaginal ring Per month: Insert vaginally, leave in for three weeks, remove for one week    multivitamin, tx-iron-ca-min (THERA-M W/ IRON) 9 mg iron-400 mcg tab tablet Take 1 Tab by mouth daily. No current facility-administered medications for this visit. Allergies   Allergen Reactions    Azithromycin Diarrhea     bloated  bloated    Clotrimazole-Betamethasone Other (comments)     Atrophy of the skin    Wellbutrin [Bupropion Hcl] Other (comments)     irritability     Immunization History   Administered Date(s) Administered    Influenza Vaccine 09/30/2013, 10/26/2018    Influenza Vaccine (Quad) 11/16/2015    Pneumococcal Polysaccharide (PPSV-23) 09/20/2018    Tdap 02/22/2016       Review of Systems   Constitutional: Negative for chills and fever. HENT: Negative for congestion and sore throat. Respiratory: Negative for cough and shortness of breath. Cardiovascular: Negative for chest pain. Gastrointestinal: Negative for nausea and vomiting.    Skin: Positive for itching and rash. Neurological: Negative for headaches. Physical Exam   Constitutional: She is oriented to person, place, and time. She appears well-developed and well-nourished. HENT:   Head: Normocephalic and atraumatic. Neck: Normal range of motion. Neck supple. No thyromegaly present. Cardiovascular: Normal rate and regular rhythm. Pulmonary/Chest: Effort normal and breath sounds normal.   Musculoskeletal:        Feet:    Deep plantar warts with overlying callous to left plantar surface       Lymphadenopathy:     She has no cervical adenopathy. Neurological: She is alert and oriented to person, place, and time. Skin: Rash noted. Erythematous plaques under bilateral breasts. Erythematous papules to perioral area/nasolabial folds. Psychiatric: She has a normal mood and affect. Her behavior is normal.   Nursing note and vitals reviewed. ASSESSMENT and PLAN  Diagnoses and all orders for this visit:    1. Plantar warts -- trial of Aldara cream for up to 12 weeks; if cost prohibitive, would have her see either dermatology or podiatry for treatment  -     imiquimod (ALDARA) 5 % cream; apply a thin layer as directed to plantar warts once nightly  -     REFERRAL TO DERMATOLOGY  -     REFERRAL TO DERMATOLOGY  -     REFERRAL TO PODIATRY    2. Perioral dermatitis  -     doxycycline (VIBRAMYCIN) 100 mg capsule; Take 1 Cap by mouth two (2) times a day.     3. Candidiasis of breast -- has not responded to Nizoral cream  -     butenafine (MENTAX) 1 % topical cream; Apply  to affected area daily.  -     REFERRAL TO DERMATOLOGY  -     REFERRAL TO DERMATOLOGY      reviewed diet, exercise and weight control  reviewed medications and side effects in detail

## 2019-07-15 RX ORDER — ESCITALOPRAM OXALATE 5 MG/1
TABLET ORAL
Qty: 90 TAB | Refills: 0 | Status: SHIPPED | OUTPATIENT
Start: 2019-07-15 | End: 2019-10-21 | Stop reason: SDUPTHER

## 2019-08-05 ENCOUNTER — APPOINTMENT (OUTPATIENT)
Dept: OBGYN CLINIC | Age: 44
End: 2019-08-05

## 2019-10-08 ENCOUNTER — TELEPHONE (OUTPATIENT)
Dept: INTERNAL MEDICINE CLINIC | Age: 44
End: 2019-10-08

## 2019-10-08 DIAGNOSIS — F90.2 ATTENTION DEFICIT HYPERACTIVITY DISORDER (ADHD), COMBINED TYPE: ICD-10-CM

## 2019-10-08 RX ORDER — DEXTROAMPHETAMINE SACCHARATE, AMPHETAMINE ASPARTATE MONOHYDRATE, DEXTROAMPHETAMINE SULFATE AND AMPHETAMINE SULFATE 2.5; 2.5; 2.5; 2.5 MG/1; MG/1; MG/1; MG/1
10 CAPSULE, EXTENDED RELEASE ORAL
Qty: 90 CAP | Refills: 0 | Status: SHIPPED | OUTPATIENT
Start: 2019-10-08 | End: 2020-01-13 | Stop reason: SDUPTHER

## 2019-10-08 NOTE — TELEPHONE ENCOUNTER
Piotr [facility] Front Office Pool         Medication Refill     Caller (if not patient):       Relationship of caller (if not patient):       Best contact number(s):  [phone]       Name of medication and dosage if known: Adderall Xr 10mg       Is patient out of this medication (yes/no): Not of yet but will be       Pharmacy name: written Rx     Pharmacy listed in chart? (yes/no):   Pharmacy phone number:       Details to clarify the request: Pt is going out of the country tomorrow 10[place]19 and returning 10/16/19 and she needs a written Rx for Adderall XR 10mg because she will run out of medication while she out of the country.        [address]

## 2019-10-08 NOTE — TELEPHONE ENCOUNTER
Pt got last refill of Aderall 7/2019 for a 90 Day supply , requesting a new script, due to going out of the country

## 2019-10-10 ENCOUNTER — DOCUMENTATION ONLY (OUTPATIENT)
Dept: INTERNAL MEDICINE CLINIC | Age: 44
End: 2019-10-10

## 2019-10-15 ENCOUNTER — TELEPHONE (OUTPATIENT)
Dept: INTERNAL MEDICINE CLINIC | Age: 44
End: 2019-10-15

## 2019-10-15 NOTE — TELEPHONE ENCOUNTER
----- Message from Aureliano Mailman sent at 10/15/2019  4:32 PM EDT -----  Regarding: NELLIE Gaxiola/Alexx  Contact: [phone]  Scheduled pt for 10/17/19 per request. Forwarding due to Level 1 symptom of wheezing. Appointment Request From: Luisana Fernandez    With Provider: Jane Barriga NP [facility]    Preferred Date Range: 10/17/2019 - 10/18/2019    Preferred Times: Any time    Reason for visit: Request an Appointment    Comments:  Cough and wheezing - potential bronchitis?

## 2019-10-17 ENCOUNTER — OFFICE VISIT (OUTPATIENT)
Dept: INTERNAL MEDICINE CLINIC | Age: 44
End: 2019-10-17

## 2019-10-17 VITALS
BODY MASS INDEX: 36.52 KG/M2 | SYSTOLIC BLOOD PRESSURE: 126 MMHG | RESPIRATION RATE: 12 BRPM | DIASTOLIC BLOOD PRESSURE: 85 MMHG | OXYGEN SATURATION: 98 % | WEIGHT: 219.2 LBS | HEIGHT: 65 IN | HEART RATE: 81 BPM | TEMPERATURE: 98.6 F

## 2019-10-17 DIAGNOSIS — J45.41 MODERATE PERSISTENT ASTHMA WITH EXACERBATION: ICD-10-CM

## 2019-10-17 DIAGNOSIS — J40 BRONCHITIS: Primary | ICD-10-CM

## 2019-10-17 RX ORDER — LEVALBUTEROL INHALATION SOLUTION 1.25 MG/3ML
1.25 SOLUTION RESPIRATORY (INHALATION)
Qty: 3 ML | Refills: 0
Start: 2019-10-17 | End: 2019-10-17

## 2019-10-17 RX ORDER — CODEINE PHOSPHATE AND GUAIFENESIN 10; 100 MG/5ML; MG/5ML
5 SOLUTION ORAL
Qty: 100 ML | Refills: 0 | Status: SHIPPED | OUTPATIENT
Start: 2019-10-17 | End: 2019-10-24

## 2019-10-17 RX ORDER — PREDNISONE 20 MG/1
TABLET ORAL
Qty: 9 TAB | Refills: 0 | Status: SHIPPED | OUTPATIENT
Start: 2019-10-17 | End: 2020-01-28 | Stop reason: ALTCHOICE

## 2019-10-17 RX ORDER — BENZONATATE 200 MG/1
200 CAPSULE ORAL
Qty: 30 CAP | Refills: 0 | Status: SHIPPED | OUTPATIENT
Start: 2019-10-17 | End: 2019-10-27

## 2019-10-17 RX ORDER — DOXYCYCLINE 100 MG/1
100 CAPSULE ORAL 2 TIMES DAILY
Qty: 14 CAP | Refills: 0 | Status: SHIPPED | OUTPATIENT
Start: 2019-10-17 | End: 2020-01-28 | Stop reason: ALTCHOICE

## 2019-10-17 NOTE — PATIENT INSTRUCTIONS
Bronchitis: Care Instructions  Your Care Instructions    Bronchitis is inflammation of the bronchial tubes, which carry air to the lungs. The tubes swell and produce mucus, or phlegm. The mucus and inflamed bronchial tubes make you cough. You may have trouble breathing. Most cases of bronchitis are caused by viruses like those that cause colds. Antibiotics usually do not help and they may be harmful. Bronchitis usually develops rapidly and lasts about 2 to 3 weeks in otherwise healthy people. Follow-up care is a key part of your treatment and safety. Be sure to make and go to all appointments, and call your doctor if you are having problems. It's also a good idea to know your test results and keep a list of the medicines you take. How can you care for yourself at home? · Take all medicines exactly as prescribed. Call your doctor if you think you are having a problem with your medicine. · Get some extra rest.  · Take an over-the-counter pain medicine, such as acetaminophen (Tylenol), ibuprofen (Advil, Motrin), or naproxen (Aleve) to reduce fever and relieve body aches. Read and follow all instructions on the label. · Do not take two or more pain medicines at the same time unless the doctor told you to. Many pain medicines have acetaminophen, which is Tylenol. Too much acetaminophen (Tylenol) can be harmful. · Take an over-the-counter cough medicine that contains dextromethorphan to help quiet a dry, hacking cough so that you can sleep. Avoid cough medicines that have more than one active ingredient. Read and follow all instructions on the label. · Breathe moist air from a humidifier, hot shower, or sink filled with hot water. The heat and moisture will thin mucus so you can cough it out. · Do not smoke. Smoking can make bronchitis worse. If you need help quitting, talk to your doctor about stop-smoking programs and medicines. These can increase your chances of quitting for good.   When should you call for help? Call 911 anytime you think you may need emergency care. For example, call if:    · You have severe trouble breathing.    Call your doctor now or seek immediate medical care if:    · You have new or worse trouble breathing.     · You cough up dark brown or bloody mucus (sputum).     · You have a new or higher fever.     · You have a new rash.    Watch closely for changes in your health, and be sure to contact your doctor if:    · You cough more deeply or more often, especially if you notice more mucus or a change in the color of your mucus.     · You are not getting better as expected. Where can you learn more? Go to http://em-yenny.info/. Enter H333 in the search box to learn more about \"Bronchitis: Care Instructions. \"  Current as of: June 9, 2019  Content Version: 12.2  © 6240-9096 [employer]. Care instructions adapted under license by [employer] (which disclaims liability or warranty for this information). If you have questions about a medical condition or this instruction, always ask your healthcare professional. [address] any warranty or liability for your use of this information. Asthma in Adults: Care Instructions  Your Care Instructions    During an asthma attack, your airways swell and narrow as a reaction to certain things (triggers). This makes it hard to breathe. You may be able to prevent asthma attacks if you avoid the things that set off your asthma symptoms. Keeping your asthma under control and treating symptoms before they get bad can help you avoid severe attacks. If you can control your asthma, you may be able to do all of your normal daily activities. You may also avoid asthma attacks and trips to the hospital.  Follow-up care is a key part of your treatment and safety. Be sure to make and go to all appointments, and call your doctor if you are having problems.  It's also a good idea to know your test results and keep a list of the medicines you take. How can you care for yourself at home? · Follow your asthma action plan so you can manage your symptoms at home. An asthma action plan will help you prevent and control airway reactions and will tell you what to do during an asthma attack. If you do not have an asthma action plan, work with your doctor to build one. · Take your asthma medicine exactly as prescribed. Medicine plays an important role in controlling asthma. Talk to your doctor right away if you have any questions about what to take and how to take it. ? Use your quick-relief medicine when you have symptoms of an attack. Quick-relief medicine often is an albuterol inhaler. Some people need to use quick-relief medicine before they exercise. ? Take your controller medicine every day, not just when you have symptoms. Controller medicine is usually an inhaled corticosteroid. The goal is to prevent problems before they occur. Do not use your controller medicine to try to treat an attack that has already started. It does not work fast enough to help. ? If your doctor prescribed corticosteroid pills to use during an attack, take them as directed. They may take hours to work, but they may shorten the attack and help you breathe better. ? Keep your quick-relief medicine with you at all times. · Talk to your doctor before using other medicines. Some medicines, such as aspirin, can cause asthma attacks in some people. · Check yourself for asthma symptoms to know which step to follow in your action plan. Watch for things like being short of breath, having chest tightness, coughing, and wheezing. Also notice if symptoms wake you up at night or if you get tired quickly when you exercise. · If you have a peak flow meter, use it to check how well you are breathing. This can help you predict when an asthma attack is going to occur.  Then you can take medicine to prevent the asthma attack or make it less severe. · See your doctor regularly. These visits will help you learn more about asthma and what you can do to control it. Your doctor will monitor your treatment to make sure the medicine is helping you. · Keep track of your asthma attacks and your treatment. After you have had an attack, write down what triggered it, what helped end it, and any concerns you have about your asthma action plan. Take your diary when you see your doctor. You can then review your asthma action plan and decide if it is working. · Do not smoke or allow others to smoke around you. Avoid smoky places. Smoking makes asthma worse. If you need help quitting, talk to your doctor about stop-smoking programs and medicines. These can increase your chances of quitting for good. · Learn what triggers an asthma attack for you, and avoid the triggers when you can. Common triggers include colds, smoke, air pollution, dust, pollen, mold, pets, cockroaches, stress, and cold air. · Avoid colds and the flu. Get a pneumococcal vaccine shot. If you have had one before, ask your doctor whether you need a second dose. Get a flu vaccine every fall. If you must be around people with colds or the flu, wash your hands often. When should you call for help? Call 911 anytime you think you may need emergency care. For example, call if:    · You have severe trouble breathing.    Call your doctor now or seek immediate medical care if:    · Your symptoms do not get better after you have followed your asthma action plan.     · You cough up yellow, dark brown, or bloody mucus (sputum).    Watch closely for changes in your health, and be sure to contact your doctor if:    · Your coughing and wheezing get worse.     · You need to use quick-relief medicine on more than 2 days a week (unless it is just for exercise).     · You need help figuring out what is triggering your asthma attacks. Where can you learn more?   Go to http://em-yenny.info/. Enter P597 in the search box to learn more about \"Asthma in Adults: Care Instructions. \"  Current as of: June 9, 2019  Content Version: 12.2  © 8232-4017 [employer]. Care instructions adapted under license by [employer] (which disclaims liability or warranty for this information). If you have questions about a medical condition or this instruction, always ask your healthcare professional. [place] [address] any warranty or liability for your use of this information.

## 2019-10-17 NOTE — PROGRESS NOTES
HISTORY OF PRESENT ILLNESS  Tim Fernandez is a 38 y.o. female. HPI  Upper respiratory illness:  Tim Fernandez presents with complaints of sore throat, post nasal drip, cough described as harsh, painful, tight with wheezing and shortness of breath; she has also been hoarse for 4 days. no nausea and no vomiting . she has not had  myalgias, fever and chills. Symptoms are moderate. Over-the-counter remedies including OTC cold and flu medication   has been used with poor relief of symptoms. Drinking plenty of fluids: yes  Asthma?:  Yes -- restarted use of Dulera 2 puffs twice daily and Albuterol inhaler; has been using Albuterol inhaler several times daily for the past several days. non-smoker  Contacts with similar infections: yes -- was just visiting [facility] and [facility] and symptoms began while she was overseas. Flew back yesterday. Patient Active Problem List   Diagnosis Code    Anxiety F41.9    Vitamin D deficiency E55.9    Hyperthyroidism E05.90    Environmental allergies Z91.09    Asthma J45.909    Headache(784.0) R51    Family history of ovarian cancer Z80.41    Breast lump on right side at 1 o'clock position N63.12    Large breasts N62    Severe obesity (BMI 35.0-39. 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       Employer: [address]   Social Needs    Financial resource strain: Not on file    Food insecurity:     Worry: Not on file     Inability: Not on file    Transportation needs:     Medical: Not on file Non-medical: Not on file   Tobacco Use    Smoking status: Never Smoker    Smokeless tobacco: Never Used   Substance and Sexual Activity    Alcohol use: Yes     Comment: 3-4 per month    Drug use: No    Sexual activity: Yes     Partners: Male     Birth control/protection: Inserts   Lifestyle    Physical activity:     Days per week: Not on file     Minutes per session: Not on file    Stress: Not on file   Relationships    Social connections:     Talks on phone: Not on file     Gets together: Not on file     Attends Caodaism service: Not on file     Active member of club or organization: Not on file     Attends meetings of clubs or organizations: Not on file     Relationship status: Not on file    Intimate partner violence:     Fear of current or ex partner: Not on file     Emotionally abused: Not on file     Physically abused: Not on file     Forced sexual activity: Not on file   Other Topics Concern    Not on file   Social History Narrative    Not on file     Family History   Problem Relation Age of Onset    Cancer Mother         cervical    Diabetes Mother     COPD Mother     Stroke Mother     Hypertension Mother     Diabetes Father     Hypertension Father     Stroke Father     Thyroid Disease Sister     Obesity Sister     Thyroid Disease Sister     Alzheimer Maternal Grandmother     COPD Maternal Grandfather     Clotting Disorder Maternal Grandfather         multiple DVTs    Alzheimer Paternal Grandmother     Alcohol abuse Paternal Grandfather     Hypertension Paternal Grandfather      Current Outpatient Medications   Medication Sig    doxycycline (VIBRAMYCIN) 100 mg capsule Take 1 Cap by mouth two (2) times a day.  levalbuterol (XOPENEX) 1.25 mg/3 mL nebu 3 mL by Nebulization route now for 1 dose.  predniSONE (DELTASONE) 20 mg tablet Take 2 tabs daily x 3 days then 1 tab daily x 2 days then 1/2 tab daily x 2 days.     mometasone-formoterol (DULERA) 100-5 mcg/actuation HFA inhaler Take 2 Puffs by inhalation two (2) times a day.  guaiFENesin-codeine (ROBITUSSIN AC) 100-10 mg/5 mL solution Take 5 mL by mouth three (3) times daily as needed for Cough for up to 7 days. Max Daily Amount: 15 mL.  benzonatate (TESSALON) 200 mg capsule Take 1 Cap by mouth three (3) times daily as needed for Cough for up to 10 days.  amphetamine-dextroamphetamine XR (ADDERALL XR) 10 mg XR capsule Take 1 Cap by mouth every morning. 90 day supply.  escitalopram oxalate (LEXAPRO) 5 mg tablet TAKE 1 TABLET BY MOUTH DAILY. DECREASED DOSE 4/24/19    ketoconazole (NIZORAL) 2 % topical cream Apply  to affected area as needed.  imiquimod (ALDARA) 5 % cream apply a thin layer as directed to plantar warts once nightly (Patient taking differently: as needed. apply a thin layer as directed to plantar warts once nightly)    butenafine (MENTAX) 1 % topical cream Apply  to affected area daily. (Patient taking differently: Apply  to affected area as needed.)    ethinyl estradiol-etonogestrel (NUVARING) 0.12-0.015 mg/24 hr vaginal ring Per month: Insert vaginally, leave in for three weeks, remove for one week    montelukast (SINGULAIR) 10 mg tablet TAKE 1 TABLET BY MOUTH ONCE DAILY    albuterol (PROAIR HFA) 90 mcg/actuation inhaler Take 1 Puff by inhalation every six (6) hours as needed for Wheezing or Shortness of Breath.  ethinyl estradiol-etonogestrel (NUVARING) 0.12-0.015 mg/24 hr vaginal ring Per month: Insert vaginally, leave in for three weeks, remove for one week    diphenhydrAMINE (BENADRYL) 25 mg capsule Take 25 mg by mouth every six (6) hours as needed.  cholecalciferol, VITAMIN D3, (VITAMIN D3) 5,000 unit tab tablet Take 1 Tab by mouth daily.  cetirizine (ZYRTEC) 10 mg tablet Take 10 mg by mouth daily.  methimazole (TAPAZOLE) 5 mg tablet Take 1 Tab by mouth daily. (Patient taking differently: Take 2.5 mg by mouth daily.  Daily)    multivitamin, tx-iron-ca-min (THERA-M W/ IRON) 9 mg iron-400 mcg tab tablet Take 1 Tab by mouth daily. No current facility-administered medications for this visit. Allergies   Allergen Reactions    Azithromycin Diarrhea     bloated  bloated    Clotrimazole-Betamethasone Other (comments)     Atrophy of the skin    Wellbutrin [Bupropion Hcl] Other (comments)     irritability     Immunization History   Administered Date(s) Administered    Influenza Vaccine 09/30/2013, 10/26/2018, 10/05/2019    Influenza Vaccine (Quad) 11/16/2015    Influenza Vaccine (Quad) Mdck Pf 10/25/2018    Pneumococcal Polysaccharide (PPSV-23) 09/20/2018    Tdap 02/22/2016         Review of Systems   Constitutional: Positive for malaise/fatigue. Negative for chills and fever. HENT: Positive for sore throat. Negative for congestion. Respiratory: Positive for cough, shortness of breath and wheezing. Negative for sputum production. Cardiovascular: Negative for chest pain. Gastrointestinal: Negative for nausea and vomiting. Musculoskeletal: Negative for myalgias. Skin: Negative for rash. Neurological: Negative for dizziness and headaches. /85 (BP 1 Location: Left arm, BP Patient Position: Sitting)   Pulse 81   Temp 98.6 °F (37 °C) (Oral)   Resp 12   Ht 5' 5\" (1.651 m)   Wt 219 lb 3.2 oz (99.4 kg)   LMP 09/18/2019 (Approximate)   SpO2 98%   BMI 36.48 kg/m²   Physical Exam   Constitutional: She is oriented to person, place, and time. She appears well-developed and well-nourished. HENT:   Head: Normocephalic and atraumatic. Right Ear: External ear normal.   Left Ear: External ear normal.   Nose: Nose normal.   Mouth/Throat: Posterior oropharyngeal erythema present. Neck: Normal range of motion. Neck supple. No thyromegaly present. Cardiovascular: Normal rate and regular rhythm. Pulmonary/Chest: Effort normal. She has wheezes (diffuse wheezes throughout). She has no rales. Musculoskeletal: She exhibits no edema.    Lymphadenopathy:     She has no cervical adenopathy. Neurological: She is alert and oriented to person, place, and time. Skin: Skin is warm and dry. Psychiatric: Her behavior is normal.   Nursing note and vitals reviewed. ASSESSMENT and PLAN  Diagnoses and all orders for this visit:    1. Bronchitis -- appears viral at this time with exacerbation of her asthma; given Rx for Doxycycline to take if symptoms worsen or fail to improve over the next week. -     doxycycline (VIBRAMYCIN) 100 mg capsule; Take 1 Cap by mouth two (2) times a day. -     guaiFENesin-codeine (ROBITUSSIN AC) 100-10 mg/5 mL solution; Take 5 mL by mouth three (3) times daily as needed for Cough for up to 7 days. Max Daily Amount: 15 mL. -     benzonatate (TESSALON) 200 mg capsule; Take 1 Cap by mouth three (3) times daily as needed for Cough for up to 10 days. 2. Moderate persistent asthma with exacerbation -- Xopenex nebulizer treatment given with some decrease in wheezing.  -     LEVALBUTEROL, INHAL. SOL., FDA-APPROVED FINAL, NON-COMPOUND UNIT DOSE, 0.5 MG  -     levalbuterol (XOPENEX) 1.25 mg/3 mL nebu; 3 mL by Nebulization route now for 1 dose. -     AR PRESSURIZED/NONPRESSURIZED INHALATION TREATMENT  -     predniSONE (DELTASONE) 20 mg tablet; Take 2 tabs daily x 3 days then 1 tab daily x 2 days then 1/2 tab daily x 2 days. -     mometasone-formoterol (DULERA) 100-5 mcg/actuation HFA inhaler; Take 2 Puffs by inhalation two (2) times a day.       reviewed diet, exercise and weight control  reviewed medications and side effects in detail

## 2019-10-24 ENCOUNTER — OFFICE VISIT (OUTPATIENT)
Dept: DERMATOLOGY | Facility: AMBULATORY SURGERY CENTER | Age: 44
End: 2019-10-24

## 2019-10-24 VITALS
OXYGEN SATURATION: 98 % | HEART RATE: 77 BPM | RESPIRATION RATE: 16 BRPM | BODY MASS INDEX: 37.49 KG/M2 | SYSTOLIC BLOOD PRESSURE: 120 MMHG | HEIGHT: 65 IN | TEMPERATURE: 98.5 F | DIASTOLIC BLOOD PRESSURE: 86 MMHG | WEIGHT: 225 LBS

## 2019-10-24 DIAGNOSIS — Z86.19 HISTORY OF COMMON WART: ICD-10-CM

## 2019-10-24 DIAGNOSIS — D18.01 CHERRY ANGIOMA: ICD-10-CM

## 2019-10-24 DIAGNOSIS — Z98.890 HISTORY OF NEVUS EXCISION: ICD-10-CM

## 2019-10-24 DIAGNOSIS — Z12.83 SCREENING FOR MALIGNANT NEOPLASM OF SKIN: ICD-10-CM

## 2019-10-24 DIAGNOSIS — Z87.2 HISTORY OF NEVUS EXCISION: ICD-10-CM

## 2019-10-24 DIAGNOSIS — D22.9 MULTIPLE BENIGN NEVI: Primary | ICD-10-CM

## 2019-10-24 DIAGNOSIS — Z80.8 FAMILY HISTORY OF MELANOMA: ICD-10-CM

## 2019-10-24 DIAGNOSIS — L82.1 SEBORRHEIC KERATOSES: ICD-10-CM

## 2019-10-24 NOTE — PROGRESS NOTES
Written by Virginia Arboleda, as dictated by Estela Sales, [address]. Name: Christina San       Age: 38 y.o. Date: 10/24/2019    Chief Complaint: No chief complaint on file. Subjective:    HPI  Ms. Gucci Fernandez is a 38 y.o. female who presents for a full skin exam.  The patient's last skin exam was on 10/24/18 and the patient does not have current complaints related to her skin. She reports that she seen a medical dermatologist [facility]) and was treated with Cantharidin after not having significant relief with cryotherapy for plantar warts on the bottom of her left foot. This has resolved these lesions and she is not aware of any others. She reports no concerns associated with her moles. No new skin lesions of concern. She is feeling well and in her usual state of health today. She has no current illnesses, no other skin concerns. Her allergies, medications, medical, and social history are reviewed by me today. The patient's pertinent skin history includes : Personal history of multiple benign nevi removals. Family history of melanoma in her aunt and uncle. Family history of NMSC in her father. ROS: Constitutional: Negative. 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 Employer: [employer]    Financial resource strain: Not on file    Food insecurity:     Worry: Not on file     Inability: Not on file    Transportation needs:     Medical: Not on file     Non-medical: Not on file   Tobacco Use    Smoking status: Never Smoker    Smokeless tobacco: Never Used   Substance and Sexual Activity    Alcohol use: Yes     Comment: 3-4 per month    Drug use:  No  Sexual activity: Yes     Partners: Male     Birth control/protection: Inserts   Lifestyle    Physical activity:     Days per week: Not on file     Minutes per session: Not on file    Stress: Not on file   Relationships    Social connections:     Talks on phone: Not on file     Gets together: Not on file     Attends Muslim service: Not on file     Active member of club or organization: Not on file     Attends meetings of clubs or organizations: Not on file     Relationship status: Not on file    Intimate partner violence:     Fear of current or ex partner: Not on file     Emotionally abused: Not on file     Physically abused: Not on file     Forced sexual activity: Not on file   Other Topics Concern    Not on file   Social History Narrative    Not on file       Family History   Problem Relation Age of Onset    Cancer Mother         cervical    Diabetes Mother     COPD Mother     Stroke Mother     Hypertension Mother     Diabetes Father     Hypertension Father     Stroke Father     Thyroid Disease Sister     Obesity Sister     Thyroid Disease Sister     Alzheimer Maternal Grandmother     COPD Maternal Grandfather     Clotting Disorder Maternal Grandfather         multiple DVTs    Alzheimer Paternal Grandmother     Alcohol abuse Paternal Grandfather     Hypertension Paternal Grandfather        Past Medical History:   Diagnosis Date    ADHD (attention deficit hyperactivity disorder) 2008    adult. stopped meds 2015, doing well    Allergy to mold spores     Anxiety 3/2012    first rx age [age]    Asthma     mild.  Breast nodule 3/2014    R 12:00.  dense tissue. Dr. 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 Dr. Perla Farmer H/O mammogram 05/11/2017; 7/31/18; 8/05/19    Negative; Negative; negative    WAGNER (obstructive sleep apnea)     mild. Dr. Heelna Cox.   dental appliance.  Pap smear for cervical cancer screening 06/11/2019    Neg/Neg HPV    Vitamin D deficiency        Past Surgical History:   Procedure Laterality Date    HX HEENT      wisdom teeth extraction    HX KNEE ARTHROSCOPY  2003    right       Current Outpatient Medications   Medication Sig Dispense Refill    escitalopram oxalate (LEXAPRO) 5 mg tablet Take 1 Tab by mouth daily. 90 Tab 0    doxycycline (VIBRAMYCIN) 100 mg capsule Take 1 Cap by mouth two (2) times a day. 14 Cap 0    mometasone-formoterol (DULERA) 100-5 mcg/actuation HFA inhaler Take 2 Puffs by inhalation two (2) times a day. 1 Inhaler 0    guaiFENesin-codeine (ROBITUSSIN AC) 100-10 mg/5 mL solution Take 5 mL by mouth three (3) times daily as needed for Cough for up to 7 days. Max Daily Amount: 15 mL. 100 mL 0    benzonatate (TESSALON) 200 mg capsule Take 1 Cap by mouth three (3) times daily as needed for Cough for up to 10 days. 30 Cap 0    amphetamine-dextroamphetamine XR (ADDERALL XR) 10 mg XR capsule Take 1 Cap by mouth every morning. 90 day supply. 90 Cap 0    ethinyl estradiol-etonogestrel (NUVARING) 0.12-0.015 mg/24 hr vaginal ring Per month: Insert vaginally, leave in for three weeks, remove for one week 3 Device 4    montelukast (SINGULAIR) 10 mg tablet TAKE 1 TABLET BY MOUTH ONCE DAILY 30 Tab 11    albuterol (PROAIR HFA) 90 mcg/actuation inhaler Take 1 Puff by inhalation every six (6) hours as needed for Wheezing or Shortness of Breath. 3 Inhaler 3    ethinyl estradiol-etonogestrel (NUVARING) 0.12-0.015 mg/24 hr vaginal ring Per month: Insert vaginally, leave in for three weeks, remove for one week 3 Device 4    diphenhydrAMINE (BENADRYL) 25 mg capsule Take 25 mg by mouth every six (6) hours as needed.  cholecalciferol, VITAMIN D3, (VITAMIN D3) 5,000 unit tab tablet Take 1 Tab by mouth daily. 30 Tab 5    cetirizine (ZYRTEC) 10 mg tablet Take 10 mg by mouth daily.       methimazole (TAPAZOLE) 5 mg tablet Take 1 Tab by mouth daily. (Patient taking differently: Take 2.5 mg by mouth daily. Daily) 90 Tab 0    predniSONE (DELTASONE) 20 mg tablet Take 2 tabs daily x 3 days then 1 tab daily x 2 days then 1/2 tab daily x 2 days. 9 Tab 0    ketoconazole (NIZORAL) 2 % topical cream Apply  to affected area as needed.  imiquimod (ALDARA) 5 % cream apply a thin layer as directed to plantar warts once nightly (Patient taking differently: as needed. apply a thin layer as directed to plantar warts once nightly) 24 Packet 2    butenafine (MENTAX) 1 % topical cream Apply  to affected area daily. (Patient taking differently: Apply  to affected area as needed.) 30 g 1    multivitamin, tx-iron-ca-min (THERA-M W/ IRON) 9 mg iron-400 mcg tab tablet Take 1 Tab by mouth daily. Allergies   Allergen Reactions    Azithromycin Diarrhea     bloated  bloated    Clotrimazole-Betamethasone Other (comments)     Atrophy of the skin    Wellbutrin [Bupropion Hcl] Other (comments)     irritability         Objective:    Visit Vitals  /86 (BP 1 Location: Left arm, BP Patient Position: Sitting)   Pulse 77   Temp 98.5 °F (36.9 °C) (Oral)   Resp 16   Ht 5' 5\" (1.651 m)   Wt 225 lb (102.1 kg)   LMP 09/20/2019   SpO2 98%   BMI 37.44 kg/m²       Hilaria Fernandez is a 38 y.o. female who appears well and in no distress. She is awake, alert, and oriented. A skin examination was performed including her scalp, face (including eyelids), ears, neck, chest, back, abdomen, upper extremities (including digits/nails), lower extremities, breasts, buttocks; genital skin was not examined. She has pink intradermal, pink and brown intradermal nevi, compound nevi and brown junctional nevi, no concerning features for severe atypia. She has scattered waxy macules and keratotic papules consistent with seborrheic keratoses. She has scattered red papules consistent with cherry angiomas.  There is a 3 x 2 mm medium and dark maculopapular lesion with a reticular pigment network on the mid back most consistent with compound nevus -photographed for further monitoring. She has multiple scars - a few with pigment recurrence. There are healing blisters/ hyperpigmented areas on the sole and heel of the left foot. Photos from today's visit:       Mid back   Mid back (scope)      Assessment/Plan:  1. Family history of melanoma and nonmelanoma skin cancer. I discussed sun protection, sunscreen use, the warning signs of skin cancer, the need for self-skin examinations, and the need for regular practitioner exams. The patient should follow up sooner as needed if new, changing, or symptomatic skin lesions arise. 2. Normal nevi. The diagnosis of normal nevi was reviewed. I discussed sun protection, sunscreen use, the warning signs of skin cancer, mole monitoring, the need for self-skin examinations, and the need for regular practitioner exams. The patient should follow up sooner as needed if new, changing, or symptomatic skin lesions arise. Also discussed the benefits of [facility] molecular testing for nevi that she may potentially want tested for conservative treatment. 3. Seborrheic keratoses. The diagnosis was reviewed and the patient was reassured that no treatment is needed for these benign lesions. 4. Cherry angiomas. The diagnosis was reviewed and the patient was reassured that no treatment is needed for these benign lesions. 5. History of plantar warts. 6. History of nevus excision. Next skin exam:  1 year    This plan was reviewed with the patient and patient agrees. All questions were answered. This scribe documentation was reviewed by me and accurately reflects the examination and decisions made by me.

## 2019-10-24 NOTE — PROGRESS NOTES
Room: 6    Identified pt with two pt identifiers(name and ). Reviewed record in preparation for visit and have obtained necessary documentation. All patient medications has been reviewed. Chief Complaint   Patient presents with    Skin Exam       There are no preventive care reminders to display for this patient. Vitals:    10/24/19 0809   BP: 120/86   Pulse: 77   Resp: 16   Temp: 98.5 °F (36.9 °C)   TempSrc: Oral   SpO2: 98%   Weight: 102.1 kg (225 lb)   Height: 5' 5\" (1.651 m)   PainSc:   0 - No pain   LMP: 2019       1. Have you been to the ER, urgent care clinic since your last visit? Hospitalized since your last visit? No    2. Have you seen or consulted any other health care providers outside of the [address] [place] [address] since your last visit? Include any pap smears or colon screening. No      Patient is accompanied by self I have received verbal consent from [facility] BHAVNA Fernandez to discuss any/all medical information while they are present in the room.

## 2020-01-16 RX ORDER — ESCITALOPRAM OXALATE 5 MG/1
TABLET ORAL
Qty: 90 TAB | Refills: 0 | Status: SHIPPED | OUTPATIENT
Start: 2020-01-16 | End: 2020-01-28 | Stop reason: SDUPTHER

## 2020-01-28 ENCOUNTER — OFFICE VISIT (OUTPATIENT)
Dept: INTERNAL MEDICINE CLINIC | Age: 45
End: 2020-01-28

## 2020-01-28 VITALS
OXYGEN SATURATION: 96 % | TEMPERATURE: 98.7 F | DIASTOLIC BLOOD PRESSURE: 89 MMHG | BODY MASS INDEX: 35.4 KG/M2 | HEIGHT: 65 IN | SYSTOLIC BLOOD PRESSURE: 132 MMHG | WEIGHT: 212.5 LBS | RESPIRATION RATE: 18 BRPM | HEART RATE: 72 BPM

## 2020-01-28 DIAGNOSIS — F90.2 ATTENTION DEFICIT HYPERACTIVITY DISORDER (ADHD), COMBINED TYPE: ICD-10-CM

## 2020-01-28 DIAGNOSIS — Z91.018 FOOD ALLERGY: Primary | ICD-10-CM

## 2020-01-28 DIAGNOSIS — R19.5 LOOSE STOOLS: ICD-10-CM

## 2020-01-28 DIAGNOSIS — E05.90 HYPERTHYROIDISM: ICD-10-CM

## 2020-01-28 DIAGNOSIS — F41.9 ANXIETY: ICD-10-CM

## 2020-01-28 RX ORDER — ESCITALOPRAM OXALATE 5 MG/1
TABLET ORAL
Qty: 90 TAB | Refills: 0 | Status: SHIPPED | OUTPATIENT
Start: 2020-01-28 | End: 2020-07-20

## 2020-01-28 RX ORDER — DEXTROAMPHETAMINE SULFATE, DEXTROAMPHETAMINE SACCHARATE, AMPHETAMINE SULFATE AND AMPHETAMINE ASPARTATE 2.5; 2.5; 2.5; 2.5 MG/1; MG/1; MG/1; MG/1
10 CAPSULE, EXTENDED RELEASE ORAL
Qty: 90 CAP | Refills: 0 | Status: SHIPPED | OUTPATIENT
Start: 2020-02-12 | End: 2020-05-22 | Stop reason: SDUPTHER

## 2020-01-28 NOTE — PROGRESS NOTES
HISTORY OF PRESENT ILLNESS    Chief Complaint   Patient presents with    Attention Deficit Disorder     GI issues - food allergeries requesting referral to Dr Willard Crawford    Medication Evaluation       Presents for follow-up    Intermittent loose stools. Saw allergist and had testing which was positive for garlic but negative for eggs says she has loose stools after she has eggs as well. Difficulty avoiding foods with garlic. Saw GI in the distant past and had an upper endoscopy which is normal.  She would like a consultation with an integrative medicine specialist and has a good recommendation. Asks if she could have SIBO. Was tested for celiac and was negative previously. SIBO. Has tried multiple probiotics. Anxiety. Taking Lexapro 5 mg daily. Does not think she could wean off just yet but is ready fairly soon. ADD. Taking Adderall daily. Doing well. Review of Systems   All other systems reviewed and are negative, except as noted in HPI    Past Medical and Surgical History   has a past medical history of ADHD (attention deficit hyperactivity disorder) (2008), Allergy to mold spores, Anxiety (3/2012), Asthma, Breast nodule (3/2014), Environmental allergies, Family history of skin cancer, Food allergy, Graves disease (2012), H/O mammogram (05/11/2017; 7/31/18; 8/05/19), WAGNER (obstructive sleep apnea), Pap smear for cervical cancer screening (06/11/2019), and Vitamin D deficiency. She also has no past medical history of Arsenic suspected exposure, Radiation exposure, Skin cancer, Sun-damaged skin, Sunburn, blistering, or Tanning bed exposure. has a past surgical history that includes hx knee arthroscopy (2003); hx wisdom teeth extraction; and hx endoscopy (2005?). reports that she has never smoked. She has never used smokeless tobacco. She reports current alcohol use. She reports that she does not use drugs.   family history includes Alcohol abuse in her paternal grandfather; Alzheimer in her maternal grandmother and paternal grandmother; COPD in her maternal grandfather and mother; Cancer in her mother; Clotting Disorder in her maternal grandfather; Diabetes in her father and mother; Hypertension in her father, mother, and paternal grandfather; Obesity in her sister; Stroke in her father and mother; Thyroid Disease in her sister and sister. Physical Exam   Nursing note and vitals reviewed. Blood pressure 132/89, pulse 72, temperature 98.7 °F (37.1 °C), temperature source Oral, resp. rate 18, height 5' 5\" (1.651 m), weight 212 lb 8 oz (96.4 kg), last menstrual period 01/01/2020, SpO2 96 %. Constitutional:  No distress. Eyes: Conjunctivae are normal.   Ears:  Hearing grossly intact  Cardiovascular: Normal rate. regular rhythm, no murmurs or gallops  No edema  Pulmonary/Chest: Effort normal.   CTAB  Musculoskeletal: moves all 4 extremities   Neurological: Alert and oriented to person, place, and time. Skin: No rash noted. Psychiatric: Normal mood and affect. Behavior is normal.     ASSESSMENT and PLAN  Diagnoses and all orders for this visit:    1. Food allergy  2. Loose stools  -     REFERRAL TO INTEGRATIVE MEDICINE  No alarm symptoms otherwise. Could consider referral to GI for colonoscopy as another option. SIBO  is possible. 3. Attention deficit hyperactivity disorder (ADHD), combined type   Controlled on current regimen. Continue current medications as written in chart. 90 day rx  Symptoms were not controlled on generic.  -     ADDERALL XR 10 mg XR capsule; Take 1 Cap by mouth every morning. Max Daily Amount: 10 mg. BRAND MEDICALLY NECESSARY    4. Hyperthyroidism  From endocrinology Dr. Al Morgan. She thinks she has an appointment coming up soon. 5. Anxiety  Doing fairly well, but thinks she possibly can wean off of Lexapro soon. Dog passed away earlier this year. Think she will try to wean after the next couple of months.   She is also seeing a counselor.  -     escitalopram oxalate (LEXAPRO) 5 mg tablet; TAKE 1 TABLET BY MOUTH EVERY DAY        There are no Patient Instructions on file for this visit.    lab results and schedule of future lab studies reviewed with patient  reviewed medications and side effects in detail    Return to clinic for further evaluation if new symptoms develop        Current Outpatient Medications   Medication Sig    [START ON 2/12/2020] ADDERALL XR 10 mg XR capsule Take 1 Cap by mouth every morning. Max Daily Amount: 10 mg. BRAND MEDICALLY NECESSARY    escitalopram oxalate (LEXAPRO) 5 mg tablet TAKE 1 TABLET BY MOUTH EVERY DAY    mometasone-formoterol (DULERA) 100-5 mcg/actuation HFA inhaler Take 2 Puffs by inhalation two (2) times a day.  ketoconazole (NIZORAL) 2 % topical cream Apply  to affected area as needed.  imiquimod (ALDARA) 5 % cream apply a thin layer as directed to plantar warts once nightly    butenafine (MENTAX) 1 % topical cream Apply  to affected area daily.  ethinyl estradiol-etonogestrel (NUVARING) 0.12-0.015 mg/24 hr vaginal ring Per month: Insert vaginally, leave in for three weeks, remove for one week    montelukast (SINGULAIR) 10 mg tablet TAKE 1 TABLET BY MOUTH ONCE DAILY    albuterol (PROAIR HFA) 90 mcg/actuation inhaler Take 1 Puff by inhalation every six (6) hours as needed for Wheezing or Shortness of Breath.  multivitamin, tx-iron-ca-min (THERA-M W/ IRON) 9 mg iron-400 mcg tab tablet Take 1 Tab by mouth daily.  ethinyl estradiol-etonogestrel (NUVARING) 0.12-0.015 mg/24 hr vaginal ring Per month: Insert vaginally, leave in for three weeks, remove for one week    diphenhydrAMINE (BENADRYL) 25 mg capsule Take 25 mg by mouth every six (6) hours as needed.  cholecalciferol, VITAMIN D3, (VITAMIN D3) 5,000 unit tab tablet Take 1 Tab by mouth daily.  cetirizine (ZYRTEC) 10 mg tablet Take 10 mg by mouth daily.  methimazole (TAPAZOLE) 5 mg tablet Take 1 Tab by mouth daily.  (Patient taking differently: Take 2.5 mg by mouth daily. Daily)     No current facility-administered medications for this visit.

## 2020-02-12 ENCOUNTER — TELEPHONE (OUTPATIENT)
Dept: INTERNAL MEDICINE CLINIC | Age: 45
End: 2020-02-12

## 2020-02-12 NOTE — TELEPHONE ENCOUNTER
I called pt, no answer. LM on personal VM to call back to r/s appt. Pt has to see PCP for routine follow up. Please call the office back.

## 2020-02-14 ENCOUNTER — TELEPHONE (OUTPATIENT)
Dept: INTERNAL MEDICINE CLINIC | Age: 45
End: 2020-02-14

## 2020-02-14 ENCOUNTER — OFFICE VISIT (OUTPATIENT)
Dept: INTERNAL MEDICINE CLINIC | Age: 45
End: 2020-02-14

## 2020-02-14 ENCOUNTER — HOSPITAL ENCOUNTER (OUTPATIENT)
Dept: LAB | Age: 45
Discharge: HOME | End: 2020-02-14

## 2020-02-14 VITALS
SYSTOLIC BLOOD PRESSURE: 126 MMHG | BODY MASS INDEX: 35.69 KG/M2 | RESPIRATION RATE: 12 BRPM | TEMPERATURE: 98.2 F | DIASTOLIC BLOOD PRESSURE: 81 MMHG | HEIGHT: 65 IN | OXYGEN SATURATION: 98 % | WEIGHT: 214.2 LBS | HEART RATE: 69 BPM

## 2020-02-14 DIAGNOSIS — Z00.00 WELL WOMAN EXAM (NO GYNECOLOGICAL EXAM): Primary | ICD-10-CM

## 2020-02-14 DIAGNOSIS — E55.9 VITAMIN D DEFICIENCY: ICD-10-CM

## 2020-02-14 DIAGNOSIS — Z00.00 WELL WOMAN EXAM (NO GYNECOLOGICAL EXAM): ICD-10-CM

## 2020-02-14 DIAGNOSIS — Z82.3 FAMILY HISTORY OF CVA: ICD-10-CM

## 2020-02-14 DIAGNOSIS — S39.012A STRAIN OF LUMBAR REGION, INITIAL ENCOUNTER: ICD-10-CM

## 2020-02-14 DIAGNOSIS — R53.83 FATIGUE, UNSPECIFIED TYPE: ICD-10-CM

## 2020-02-14 DIAGNOSIS — E05.90 HYPERTHYROIDISM: ICD-10-CM

## 2020-02-14 LAB
25(OH)D3 SERPL-MCNC: 67.4 NG/ML (ref 30–100)
ALBUMIN SERPL-MCNC: 3.7 G/DL (ref 3.5–5)
ALBUMIN/GLOB SERPL: 1.1 {RATIO} (ref 1.1–2.2)
ALP SERPL-CCNC: 74 U/L (ref 45–117)
ALT SERPL-CCNC: 22 U/L (ref 12–78)
ANION GAP SERPL CALC-SCNC: 2 MMOL/L (ref 5–15)
APPEARANCE UR: CLEAR
AST SERPL-CCNC: 11 U/L (ref 15–37)
BASOPHILS # BLD: 0.1 K/UL (ref 0–0.1)
BASOPHILS NFR BLD: 1 % (ref 0–1)
BILIRUB SERPL-MCNC: 0.6 MG/DL (ref 0.2–1)
BILIRUB UR QL: NEGATIVE
BUN SERPL-MCNC: 16 MG/DL (ref 6–20)
BUN/CREAT SERPL: 23 (ref 12–20)
CALCIUM SERPL-MCNC: 9.3 MG/DL (ref 8.5–10.1)
CHLORIDE SERPL-SCNC: 110 MMOL/L (ref 97–108)
CHOLEST SERPL-MCNC: 225 MG/DL
CO2 SERPL-SCNC: 27 MMOL/L (ref 21–32)
COLOR UR: NORMAL
CREAT SERPL-MCNC: 0.71 MG/DL (ref 0.55–1.02)
CRP SERPL HS-MCNC: 6.3 MG/L
DIFFERENTIAL METHOD BLD: ABNORMAL
EOSINOPHIL # BLD: 0.3 K/UL (ref 0–0.4)
EOSINOPHIL NFR BLD: 3 % (ref 0–7)
ERYTHROCYTE [DISTWIDTH] IN BLOOD BY AUTOMATED COUNT: 12.8 % (ref 11.5–14.5)
EST. AVERAGE GLUCOSE BLD GHB EST-MCNC: 120 MG/DL
FERRITIN SERPL-MCNC: 38 NG/ML (ref 8–252)
GLOBULIN SER CALC-MCNC: 3.5 G/DL (ref 2–4)
GLUCOSE SERPL-MCNC: 93 MG/DL (ref 65–100)
GLUCOSE UR STRIP.AUTO-MCNC: NEGATIVE MG/DL
HBA1C MFR BLD: 5.8 % (ref 4–5.6)
HCT VFR BLD AUTO: 43.1 % (ref 35–47)
HCYS SERPL-SCNC: 7.3 UMOL/L (ref 3.7–13.9)
HDLC SERPL-MCNC: 66 MG/DL
HDLC SERPL: 3.4 {RATIO} (ref 0–5)
HGB BLD-MCNC: 12.9 G/DL (ref 11.5–16)
HGB UR QL STRIP: NEGATIVE
IMM GRANULOCYTES # BLD AUTO: 0 K/UL (ref 0–0.04)
IMM GRANULOCYTES NFR BLD AUTO: 0 % (ref 0–0.5)
KETONES UR QL STRIP.AUTO: NEGATIVE MG/DL
LDLC SERPL CALC-MCNC: 130.6 MG/DL (ref 0–100)
LEUKOCYTE ESTERASE UR QL STRIP.AUTO: NEGATIVE
LIPID PROFILE,FLP: ABNORMAL
LYMPHOCYTES # BLD: 3.1 K/UL (ref 0.8–3.5)
LYMPHOCYTES NFR BLD: 37 % (ref 12–49)
MCH RBC QN AUTO: 27.2 PG (ref 26–34)
MCHC RBC AUTO-ENTMCNC: 29.9 G/DL (ref 30–36.5)
MCV RBC AUTO: 90.7 FL (ref 80–99)
MONOCYTES # BLD: 0.4 K/UL (ref 0–1)
MONOCYTES NFR BLD: 5 % (ref 5–13)
NEUTS SEG # BLD: 4.5 K/UL (ref 1.8–8)
NEUTS SEG NFR BLD: 54 % (ref 32–75)
NITRITE UR QL STRIP.AUTO: NEGATIVE
NRBC # BLD: 0 K/UL (ref 0–0.01)
NRBC BLD-RTO: 0 PER 100 WBC
PH UR STRIP: 5 [PH] (ref 5–8)
PLATELET # BLD AUTO: 428 K/UL (ref 150–400)
PMV BLD AUTO: 9.4 FL (ref 8.9–12.9)
POTASSIUM SERPL-SCNC: 4.2 MMOL/L (ref 3.5–5.1)
PROT SERPL-MCNC: 7.2 G/DL (ref 6.4–8.2)
PROT UR STRIP-MCNC: NEGATIVE MG/DL
RBC # BLD AUTO: 4.75 M/UL (ref 3.8–5.2)
SODIUM SERPL-SCNC: 139 MMOL/L (ref 136–145)
SP GR UR REFRACTOMETRY: 1.01 (ref 1–1.03)
TRIGL SERPL-MCNC: 142 MG/DL (ref ?–150)
UROBILINOGEN UR QL STRIP.AUTO: 0.2 EU/DL (ref 0.2–1)
VIT B12 SERPL-MCNC: 337 PG/ML (ref 193–986)
VLDLC SERPL CALC-MCNC: 28.4 MG/DL
WBC # BLD AUTO: 8.3 K/UL (ref 3.6–11)

## 2020-02-14 RX ORDER — IBUPROFEN 200 MG
600 TABLET ORAL
Qty: 30 TAB | Refills: 0
Start: 2020-02-14

## 2020-02-14 RX ORDER — CYCLOBENZAPRINE HCL 5 MG
TABLET ORAL
Qty: 30 TAB | Refills: 0 | Status: SHIPPED | OUTPATIENT
Start: 2020-02-14 | End: 2021-03-31 | Stop reason: ALTCHOICE

## 2020-02-14 NOTE — TELEPHONE ENCOUNTER
Returned call to pharmacy. Patient only using prn and short term. Hyperthyroidism is being treated and well controlled. Advised pharmacy per NP okay to fill.     Mylene Yoon, PharmD, BCPS, CDE

## 2020-02-14 NOTE — PATIENT INSTRUCTIONS
Well Visit, Ages 25 to 48: Care Instructions  Your Care Instructions    Physical exams can help you stay healthy. Your doctor has checked your overall health and may have suggested ways to take good care of yourself. He or she also may have recommended tests. At home, you can help prevent illness with healthy eating, regular exercise, and other steps. Follow-up care is a key part of your treatment and safety. Be sure to make and go to all appointments, and call your doctor if you are having problems. It's also a good idea to know your test results and keep a list of the medicines you take. How can you care for yourself at home? · Reach and stay at a healthy weight. This will lower your risk for many problems, such as obesity, diabetes, heart disease, and high blood pressure. · Get at least 30 minutes of physical activity on most days of the week. Walking is a good choice. You also may want to do other activities, such as running, swimming, cycling, or playing tennis or team sports. Discuss any changes in your exercise program with your doctor. · Do not smoke or allow others to smoke around you. If you need help quitting, talk to your doctor about stop-smoking programs and medicines. These can increase your chances of quitting for good. · Talk to your doctor about whether you have any risk factors for sexually transmitted infections (STIs). Having one sex partner (who does not have STIs and does not have sex with anyone else) is a good way to avoid these infections. · Use birth control if you do not want to have children at this time. Talk with your doctor about the choices available and what might be best for you. · Protect your skin from too much sun. When you're outdoors from 10 a.m. to 4 p.m., stay in the shade or cover up with clothing and a hat with a wide brim. Wear sunglasses that block UV rays. Even when it's cloudy, put broad-spectrum sunscreen (SPF 30 or higher) on any exposed skin.   · See a dentist one or two times a year for checkups and to have your teeth cleaned. · Wear a seat belt in the car. Follow your doctor's advice about when to have certain tests. These tests can spot problems early. For everyone  · Cholesterol. Have the fat (cholesterol) in your blood tested after age 21. Your doctor will tell you how often to have this done based on your age, family history, or other things that can increase your risk for heart disease. · Blood pressure. Have your blood pressure checked during a routine doctor visit. Your doctor will tell you how often to check your blood pressure based on your age, your blood pressure results, and other factors. · Vision. Talk with your doctor about how often to have a glaucoma test.  · Diabetes. Ask your doctor whether you should have tests for diabetes. · Colon cancer. Your risk for colorectal cancer gets higher as you get older. Some experts say that adults should start regular screening at age 48 and stop at age 76. Others say to start before age 48 or continue after age 76. Talk with your doctor about your risk and when to start and stop screening. For women  · Breast exam and mammogram. Talk to your doctor about when you should have a clinical breast exam and a mammogram. Medical experts differ on whether and how often women under 50 should have these tests. Your doctor can help you decide what is right for you. · Cervical cancer screening test and pelvic exam. Begin with a Pap test at age 24. The test often is part of a pelvic exam. Starting at age 27, you may choose to have a Pap test, an HPV test, or both tests at the same time (called co-testing). Talk with your doctor about how often to have testing. · Tests for sexually transmitted infections (STIs). Ask whether you should have tests for STIs. You may be at risk if you have sex with more than one person, especially if your partners do not wear condoms.   For men  · Tests for sexually transmitted infections (STIs). Ask whether you should have tests for STIs. You may be at risk if you have sex with more than one person, especially if you do not wear a condom. · Testicular cancer exam. Ask your doctor whether you should check your testicles regularly. · Prostate exam. Talk to your doctor about whether you should have a blood test (called a PSA test) for prostate cancer. Experts differ on whether and when men should have this test. Some experts suggest it if you are older than 39 and are -American or have a father or brother who got prostate cancer when he was younger than 72. When should you call for help? Watch closely for changes in your health, and be sure to contact your doctor if you have any problems or symptoms that concern you. Where can you learn more? Go to http://em-yenny.info/. Enter P072 in the search box to learn more about \"Well Visit, Ages 25 to 48: Care Instructions. \"  Current as of: December 13, 2018  Content Version: 12.2  © 5151-5260 [employer]. Care instructions adapted under license by [employer] (which disclaims liability or warranty for this information). If you have questions about a medical condition or this instruction, always ask your healthcare professional. [place] [address] any warranty or liability for your use of this information. Learning About Vitamin D  Why is it important to get enough vitamin D? Your body needs vitamin D to absorb calcium. Calcium keeps your bones and muscles, including your heart, healthy and strong. If your muscles don't get enough calcium, they can cramp, hurt, or feel weak. You may have long-term (chronic) muscle aches and pains. If you don't get enough vitamin D throughout life, you have an increased chance of having thin and brittle bones (osteoporosis) in your later years. Children who don't get enough vitamin D may not grow as much as others their age.  They also have a chance of getting a rare disease called rickets. It causes weak bones. Vitamin D and calcium are added to many foods. And your body uses sunshine to make its own vitamin D. How much vitamin D do you need? The [employer] recommends that people ages 3 through 79 get 600 IU (international units) every day. Adults 71 and older need 800 IU every day. Blood tests for vitamin D can check your vitamin D level. But there is no standard normal range used by all laboratories. You're likely getting enough vitamin D if your levels are in the range of 20 to 50 ng/mL. How can you get more vitamin D? Foods that contain vitamin D include:  · [place], tuna, and mackerel. These are some of the best foods to eat when you need to get more vitamin D.  · Cheese, egg yolks, and beef liver. These foods have vitamin D in small amounts. · Milk, soy drinks, orange juice, yogurt, margarine, and some kinds of cereal have vitamin D added to them. Some people don't make vitamin D as well as others. They may have to take extra care in getting enough vitamin D. Things that reduce how much vitamin D your body makes include:  · Dark skin, such as many  Americans have. · Age, especially if you are older than 72. · Digestive problems, such as Crohn's or celiac disease. · Liver and kidney disease. Some people who do not get enough vitamin D may need supplements. Are there any risks from taking vitamin D?  · Too much vitamin D:  ? Can damage your kidneys. ? Can cause nausea and vomiting, constipation, and weakness. ? Raises the amount of calcium in your blood. If this happens, you can get confused or have an irregular heart rhythm. · Vitamin D may interact with other medicines. Tell your doctor about all of the medicines you take, including over-the-counter drugs, herbs, and pills. Tell your doctor about all of your current medical problems. Where can you learn more?   Go to http://em-yenny.info/. Enter [id number] in the search box to learn more about \"Learning About Vitamin D.\"  Current as of: November 7, 2018  Content Version: 12.2  © 3863-9761 [employer]. Care instructions adapted under license by [employer] (which disclaims liability or warranty for this information). If you have questions about a medical condition or this instruction, always ask your healthcare professional. [address] any warranty or liability for your use of this information. Back Stretches: Exercises  Introduction  Here are some examples of exercises for stretching your back. Start each exercise slowly. Ease off the exercise if you start to have pain. Your doctor or physical therapist will tell you when you can start these exercises and which ones will work best for you. How to do the exercises  Overhead stretch    1. Stand comfortably with your feet shoulder-width apart. 2. Looking straight ahead, raise both arms over your head and reach toward the ceiling. Do not allow your head to tilt back. 3. Hold for 15 to 30 seconds, then lower your arms to your sides. 4. Repeat 2 to 4 times. Side stretch    1. Stand comfortably with your feet shoulder-width apart. 2. Raise one arm over your head, and then lean to the other side. 3. Slide your hand down your leg as you let the weight of your arm gently stretch your side muscles. Hold for 15 to 30 seconds. 4. Repeat 2 to 4 times on each side. Press-up    1. Lie on your stomach, supporting your body with your forearms. 2. Press your elbows down into the floor to raise your upper back. As you do this, relax your stomach muscles and allow your back to arch without using your back muscles. As your press up, do not let your hips or pelvis come off the floor. 3. Hold for 15 to 30 seconds, then relax. 4. Repeat 2 to 4 times. Relax and rest    1.  Lie on your back with a rolled towel under your neck and a pillow under your knees. Extend your arms comfortably to your sides. 2. Relax and breathe normally. 3. Remain in this position for about 10 minutes. 4. If you can, do this 2 or 3 times each day. Follow-up care is a key part of your treatment and safety. Be sure to make and go to all appointments, and call your doctor if you are having problems. It's also a good idea to know your test results and keep a list of the medicines you take. Where can you learn more? Go to http://em-yenny.info/. Enter A035 in the search box to learn more about \"Back Stretches: Exercises. \"  Current as of: June 26, 2019  Content Version: 12.2  © 7738-7081 [employer]. Care instructions adapted under license by [employer] (which disclaims liability or warranty for this information). If you have questions about a medical condition or this instruction, always ask your healthcare professional. [place] [address] any warranty or liability for your use of this information.

## 2020-02-14 NOTE — TELEPHONE ENCOUNTER
Pharmacy called to report that Np sent flexeral script in for patient. Pharmacy advises due to patient having hypo thyroidism it conflicts with medication. Please call [facility] to advise.    [phone]

## 2020-02-14 NOTE — TELEPHONE ENCOUNTER
They don't recommendation using mucsle relaxant with hypothyroidism; increases the risk of serotonin potential. Diazepam is medication that can be used.

## 2020-02-14 NOTE — PROGRESS NOTES
Subjective:   38 y.o. female for Well Woman Check. Her gyne and breast care is done elsewhere by her Ob-Gyne physician. She sees Dr Aditi Ennis for GYN care and had normal pap in 6/2019; normal mammogram in 8/2019. She brings form for biometric screening and is fasting today. She has been having ongoing issues with loose stools for years and is currently seeing efren Najeracassidy with [address] and is going an evaluation; she brings with her a list of labs that she would like ordered. Will be seeing her Endocrinologist net week for management of her Hyperthyroidism; she also has an appointment in March with GI, Dr Joao Ortiz. Has seen an allergist in the past and had some testing for food allergies but feels there are more issues contributing to her bowel symptoms. Reports moving 2 heavy furniture pieces yesterday by herself and this am awoke with right sided lower back pain and stiffness. Denies radicular pain down legs; denies loss of bowel or bladder control. Has not taken any OTC medications for current symptoms. Current Outpatient Medications   Medication Sig Dispense Refill    cyclobenzaprine (FLEXERIL) 5 mg tablet Take 1-2 tabs three times daily as needed for muscle spasm 30 Tab 0    ibuprofen (MOTRIN) 200 mg tablet Take 3 Tabs by mouth every eight (8) hours as needed for Pain. 30 Tab 0    ADDERALL XR 10 mg XR capsule Take 1 Cap by mouth every morning. Max Daily Amount: 10 mg. BRAND MEDICALLY NECESSARY 90 Cap 0    escitalopram oxalate (LEXAPRO) 5 mg tablet TAKE 1 TABLET BY MOUTH EVERY DAY 90 Tab 0    montelukast (SINGULAIR) 10 mg tablet TAKE 1 TABLET BY MOUTH ONCE DAILY 30 Tab 11    albuterol (PROAIR HFA) 90 mcg/actuation inhaler Take 1 Puff by inhalation every six (6) hours as needed for Wheezing or Shortness of Breath. 3 Inhaler 3    ethinyl estradiol-etonogestrel (NUVARING) 0.12-0.015 mg/24 hr vaginal ring Per month:  Insert vaginally, leave in for three weeks, remove for one week 3 Device 4    diphenhydrAMINE (BENADRYL) 25 mg capsule Take 25 mg by mouth every six (6) hours as needed.  cholecalciferol, VITAMIN D3, (VITAMIN D3) 5,000 unit tab tablet Take 1 Tab by mouth daily. 30 Tab 5    cetirizine (ZYRTEC) 10 mg tablet Take 10 mg by mouth daily.  methimazole (TAPAZOLE) 5 mg tablet Take 1 Tab by mouth daily. (Patient taking differently: Take 2.5 mg by mouth daily. Daily) 90 Tab 0    mometasone-formoterol (DULERA) 100-5 mcg/actuation HFA inhaler Take 2 Puffs by inhalation two (2) times a day. 1 Inhaler 0     Allergies   Allergen Reactions    Azithromycin Diarrhea     bloated  bloated    Clotrimazole-Betamethasone Other (comments)     Atrophy of the skin    Wellbutrin [Bupropion Hcl] Other (comments)     irritability     Past Medical History:   Diagnosis Date    ADHD (attention deficit hyperactivity disorder) 2008    adult.  Allergy to mold spores     Anxiety 3/2012    first rx age 33 5    Asthma     mild.  Breast nodule 3/2014    R 12:00.  dense tissue. Dr. Darrius Lacy Environmental allergies     hx shots as a child    Family history of skin cancer     maternal aunt and uncle    Food allergy     garlic (test positive), eggs (but tested neg)    Graves disease 2012    with toxic MNG. Dr. Fuentes Palma H/O mammogram 05/11/2017; 7/31/18; 8/05/19    Negative; Negative; negative    WAGNER (obstructive sleep apnea)     mild. Dr. Brendon Lee. dental appliance.  Pap smear for cervical cancer screening 06/11/2019    Neg/Neg HPV    Vitamin D deficiency      Past Surgical History:   Procedure Laterality Date    HX ENDOSCOPY  2005?     HX KNEE ARTHROSCOPY  2003    right    HX WISDOM TEETH EXTRACTION       Family History   Problem Relation Age of Onset    Cancer Mother         cervical    Diabetes Mother     COPD Mother     Stroke Mother     Hypertension Mother     Diabetes Father     Hypertension Father     Stroke Father    [place] Thyroid Disease Sister     Obesity Sister     Thyroid Disease Sister     Alzheimer Maternal Grandmother     COPD Maternal Grandfather     Clotting Disorder Maternal Grandfather         multiple DVTs    Alzheimer Paternal Grandmother     Alcohol abuse Paternal Grandfather     Hypertension Paternal Grandfather      Social History     Tobacco Use    Smoking status: Never Smoker    Smokeless tobacco: Never Used   Substance Use Topics    Alcohol use: Yes     Comment: 3-4 per month             ROS: Feeling generally well. No TIA's or unusual headaches, no dysphagia. No prolonged cough. No dyspnea or chest pain on exertion. No abdominal pain, change in bowel habits, black or bloody stools. No urinary tract symptoms. No new or unusual musculoskeletal symptoms. Specific concerns today: lower back pain; persistent loose stools. Objective: The patient appears well, alert, oriented x 3, in no distress. Visit Vitals  /81 (BP 1 Location: Left arm, BP Patient Position: Sitting)   Pulse 69   Temp 98.2 °F (36.8 °C) (Oral)   Resp 12   Ht 5' 5\" (1.651 m)   Wt 214 lb 3.2 oz (97.2 kg)   SpO2 98%   BMI 35.64 kg/m²     ENT normal.  Neck supple. No adenopathy or thyromegaly. GENIA. Lungs are clear, good air entry, no wheezes, rhonchi or rales. S1 and S2 normal, no murmurs, regular rate and rhythm. Abdomen soft without tenderness, guarding, mass or organomegaly. Extremities show no edema, normal peripheral pulses. Neurological is normal, no focal findings. Breast and Pelvic exams are deferred. Assessment/Plan:   Well Woman  lose weight, increase physical activity, routine labs ordered  Diagnoses and all orders for this visit:    1. Well woman exam (no gynecological exam)  -     CBC WITH AUTOMATED DIFF; Future  -     METABOLIC PANEL, COMPREHENSIVE; Future  -     LIPID PANEL; Future  -     URINALYSIS W/ RFLX MICROSCOPIC; Future    2.  Fatigue, unspecified type  -     VITAMIN B12; Future  -     HEMOGLOBIN A1C WITH EAG; Future  -     FERRITIN; Future    3. Vitamin D deficiency  -     VITAMIN D, 25 HYDROXY; Future    4. Hyperthyroidism -- followed by Endocrinology    5. Family history of CVA  -     HOMOCYSTEINE, PLASMA; Future  -     CRP, HIGH SENSITIVITY; Future  -     FIBRINOGEN; Future    6. Strain of lumbar region, initial encounter -- discussed ice and moist heat; stretching exercises given. -     cyclobenzaprine (FLEXERIL) 5 mg tablet; Take 1-2 tabs three times daily as needed for muscle spasm  -     ibuprofen (MOTRIN) 200 mg tablet; Take 3 Tabs by mouth every eight (8) hours as needed for Pain. Hilaria Fernandez was counseled on age-appropriate/ guideline-based risk prevention behaviors and screening for a 38y.o. year old   female . We also discussed adjustments in screening based on family history if necessary. Printed instructions for preventative screening guidelines and healthy behaviors given to patient with after visit summary.         lab results and schedule of future lab studies reviewed with patient  reviewed diet, exercise and weight control  cardiovascular risk and specific lipid/LDL goals reviewed

## 2020-02-14 NOTE — TELEPHONE ENCOUNTER
Are they aware that she actually has HYPERthyroidism not HYPOthyroid?  I can send in a low dose of Diazepam that she can take as needed

## 2020-02-17 ENCOUNTER — HOSPITAL ENCOUNTER (OUTPATIENT)
Dept: LAB | Age: 45
Discharge: HOME | End: 2020-02-17

## 2020-02-17 DIAGNOSIS — Z82.3 FAMILY HISTORY OF STROKE (CEREBROVASCULAR): Primary | ICD-10-CM

## 2020-02-17 DIAGNOSIS — Z82.3 FAMILY HISTORY OF STROKE (CEREBROVASCULAR): ICD-10-CM

## 2020-02-17 LAB — FIBRINOGEN PPP-MCNC: 368 MG/DL (ref 200–475)

## 2020-03-16 ENCOUNTER — TELEPHONE (OUTPATIENT)
Dept: INTERNAL MEDICINE CLINIC | Age: 45
End: 2020-03-16

## 2020-03-16 RX ORDER — FLUTICASONE FUROATE AND VILANTEROL TRIFENATATE 200; 25 UG/1; UG/1
1 POWDER RESPIRATORY (INHALATION) DAILY
Qty: 1 EACH | Refills: 2 | Status: SHIPPED | OUTPATIENT
Start: 2020-03-16 | End: 2021-04-27

## 2020-03-16 NOTE — TELEPHONE ENCOUNTER
----- Message from Gena Guadalupe sent at 3/16/2020 11:45 AM EDT -----  Regarding: NELLIE Gaxiola/telephone  Contact: [phone]  Caller's first and last name: Pt  Reason for call: Pre-authorization  Callback required yes/no and why: yes  Best contact number(s): [phone]  Details to clarify the request: Pt's pharmacy on file [facility] is requesting preauthorization of pt's medicine [facility] Inhaler.

## 2020-03-16 NOTE — TELEPHONE ENCOUNTER
Pts insurance prefers : Available Formulary Alternatives: ADVAIR DISKUS, ADVAIR HFA, BREO ELLIPTA, SYMBICORT. Could pt try on these inhalers ? If so, please send a new rx to pts pharmacy.

## 2020-05-11 RX ORDER — MONTELUKAST SODIUM 10 MG/1
TABLET ORAL
Qty: 90 TAB | Refills: 3 | Status: SHIPPED | OUTPATIENT
Start: 2020-05-11 | End: 2021-04-27

## 2020-05-22 DIAGNOSIS — F90.2 ATTENTION DEFICIT HYPERACTIVITY DISORDER (ADHD), COMBINED TYPE: ICD-10-CM

## 2020-05-23 RX ORDER — DEXTROAMPHETAMINE SULFATE, DEXTROAMPHETAMINE SACCHARATE, AMPHETAMINE SULFATE AND AMPHETAMINE ASPARTATE 2.5; 2.5; 2.5; 2.5 MG/1; MG/1; MG/1; MG/1
10 CAPSULE, EXTENDED RELEASE ORAL
Qty: 90 CAP | Refills: 0 | Status: SHIPPED | OUTPATIENT
Start: 2020-05-23 | End: 2020-08-25 | Stop reason: SDUPTHER

## 2020-07-26 ENCOUNTER — PATIENT MESSAGE (OUTPATIENT)
Dept: OBGYN CLINIC | Age: 45
End: 2020-07-26

## 2020-07-27 NOTE — PATIENT INSTRUCTIONS
Well Visit, Ages 25 to 48: Care Instructions  Your Care Instructions     Physical exams can help you stay healthy. Your doctor has checked your overall health and may have suggested ways to take good care of yourself. He or she also may have recommended tests. At home, you can help prevent illness with healthy eating, regular exercise, and other steps. Follow-up care is a key part of your treatment and safety. Be sure to make and go to all appointments, and call your doctor if you are having problems. It's also a good idea to know your test results and keep a list of the medicines you take. How can you care for yourself at home? · Reach and stay at a healthy weight. This will lower your risk for many problems, such as obesity, diabetes, heart disease, and high blood pressure. · Get at least 30 minutes of physical activity on most days of the week. Walking is a good choice. You also may want to do other activities, such as running, swimming, cycling, or playing tennis or team sports. Discuss any changes in your exercise program with your doctor. · Do not smoke or allow others to smoke around you. If you need help quitting, talk to your doctor about stop-smoking programs and medicines. These can increase your chances of quitting for good. · Talk to your doctor about whether you have any risk factors for sexually transmitted infections (STIs). Having one sex partner (who does not have STIs and does not have sex with anyone else) is a good way to avoid these infections. · Use birth control if you do not want to have children at this time. Talk with your doctor about the choices available and what might be best for you. · Protect your skin from too much sun. When you're outdoors from 10 a.m. to 4 p.m., stay in the shade or cover up with clothing and a hat with a wide brim. Wear sunglasses that block UV rays. Even when it's cloudy, put broad-spectrum sunscreen (SPF 30 or higher) on any exposed skin.   · See a dentist one or two times a year for checkups and to have your teeth cleaned. · Wear a seat belt in the car. Follow your doctor's advice about when to have certain tests. These tests can spot problems early. For everyone  · Cholesterol. Have the fat (cholesterol) in your blood tested after age 21. Your doctor will tell you how often to have this done based on your age, family history, or other things that can increase your risk for heart disease. · Blood pressure. Have your blood pressure checked during a routine doctor visit. Your doctor will tell you how often to check your blood pressure based on your age, your blood pressure results, and other factors. · Vision. Talk with your doctor about how often to have a glaucoma test.  · Diabetes. Ask your doctor whether you should have tests for diabetes. · Colon cancer. Your risk for colorectal cancer gets higher as you get older. Some experts say that adults should start regular screening at age 48 and stop at age 76. Others say to start before age 48 or continue after age 76. Talk with your doctor about your risk and when to start and stop screening. For women  · Breast exam and mammogram. Talk to your doctor about when you should have a clinical breast exam and a mammogram. Medical experts differ on whether and how often women under 50 should have these tests. Your doctor can help you decide what is right for you. · Cervical cancer screening test and pelvic exam. Begin with a Pap test at age 24. The test often is part of a pelvic exam. Starting at age 27, you may choose to have a Pap test, an HPV test, or both tests at the same time (called co-testing). Talk with your doctor about how often to have testing. · Tests for sexually transmitted infections (STIs). Ask whether you should have tests for STIs. You may be at risk if you have sex with more than one person, especially if your partners do not wear condoms.   For men  · Tests for sexually transmitted infections (STIs). Ask whether you should have tests for STIs. You may be at risk if you have sex with more than one person, especially if you do not wear a condom. · Testicular cancer exam. Ask your doctor whether you should check your testicles regularly. · Prostate exam. Talk to your doctor about whether you should have a blood test (called a PSA test) for prostate cancer. Experts differ on whether and when men should have this test. Some experts suggest it if you are older than 39 and are -American or have a father or brother who got prostate cancer when he was younger than 72. When should you call for help? Watch closely for changes in your health, and be sure to contact your doctor if you have any problems or symptoms that concern you. Where can you learn more? Go to http://em-yenny.info/  Enter P072 in the search box to learn more about \"Well Visit, Ages 25 to 48: Care Instructions. \"  Current as of: August 22, 2019               Content Version: 12.5  © 1697-4017 [employer], [employer]orporated. Care instructions adapted under license by [employer] (which disclaims liability or warranty for this information). If you have questions about a medical condition or this instruction, always ask your healthcare professional. [place] [address] any warranty or liability for your use of this information.

## 2020-07-28 ENCOUNTER — OFFICE VISIT (OUTPATIENT)
Dept: OBGYN CLINIC | Age: 45
End: 2020-07-28

## 2020-07-28 VITALS — BODY MASS INDEX: 36.61 KG/M2 | WEIGHT: 220 LBS | DIASTOLIC BLOOD PRESSURE: 89 MMHG | SYSTOLIC BLOOD PRESSURE: 124 MMHG

## 2020-07-28 DIAGNOSIS — Z76.89 ENCOUNTER FOR MENSTRUAL REGULATION: ICD-10-CM

## 2020-07-28 DIAGNOSIS — Z01.419 ENCOUNTER FOR GYNECOLOGICAL EXAMINATION (GENERAL) (ROUTINE) WITHOUT ABNORMAL FINDINGS: Primary | ICD-10-CM

## 2020-07-28 NOTE — PROGRESS NOTES
[facility]  [email or web address]  [phone]    Lala Malloy MD, [address] [facility]       Annual Gynecologic Exam  Karin Yoder 39 40-60  Chief Complaint   Patient presents with    Well Woman       Sarah De Leon Rash is a 41 y.o. No obstetric history on file. WHITE OR   female who presents for an annual exam.  Patient's last menstrual period was 06/25/2020 (exact date). .    She does not report additional concerns today. Menstrual status:  Her periods are regular cycles. She does not report dysmenorrhea/painful menses. She does not report irregular bleeding. Sexual history and Contraception:  Social History     Substance and Sexual Activity   Sexual Activity Yes    Partners: Male    Birth control/protection: Inserts       She does not reports new sexual partner(s) in the last year. The patient does not request STD testing. Preventive Medicine History:  Her most recent Pap smear result: normal was obtained in June 2019    Her most recent HR HPV screen was Negative obtained in 2019    She does not have a history of KENZIE 2, 3 or cervical cancer. Breast health:  Last mammogram: was normal.   A mammogram was not scheduled for today. Breast cancer family updated: see FH. Bone health: Shelley Fernanedz She does not have a history of osteopenia/osteoporosis. Osteoporosis family history updated: see FH. Past Medical History:   Diagnosis Date    ADHD (attention deficit hyperactivity disorder) 2008    adult.  Allergy to mold spores     Anxiety 3/2012    first rx age 33 5    Asthma     mild.  Breast nodule 3/2014    R 12:00.  dense tissue. Dr. Renetta Ovalle Environmental allergies     hx shots as a child    Family history of skin cancer     maternal aunt and uncle    Food allergy     garlic (test positive), eggs (but tested neg)    Graves disease 2012    with toxic MNG.   Dr. Jana De Leon H/O mammogram 05/11/2017; 7/31/18; 8/05/19    Negative; Negative; negative    WAGNER (obstructive sleep apnea)     mild. Dr. Boogie Hanley. dental appliance.  Pap smear for cervical cancer screening 06/11/2019    Neg/Neg HPV    Vitamin D deficiency      OB History   Obstetric Comments   Menarche:  16. LMP: 1/2/14. # of Children:  0. Age at Delivery of First Child:  n/a. Hysterectomy/oophorectomy:  NO/NO. Breast Bx:  no.  Hx of Breast Feeding:  no. BCP:  yes. Hormone therapy:  no.       Past Surgical History:   Procedure Laterality Date    HX ENDOSCOPY  2005?  HX KNEE ARTHROSCOPY  2003    right    HX WISDOM TEETH EXTRACTION       Family History   Problem Relation Age of Onset   Gage.Sarah Cancer Mother         cervical    Diabetes Mother    Gage.Sarah COPD Mother    [facility] Stroke Mother     Hypertension Mother    [facility] Diabetes Father     Hypertension Father     Stroke Father     Thyroid Disease Sister     Obesity Sister     Thyroid Disease Sister     Alzheimer Maternal Grandmother     COPD Maternal Grandfather     Clotting Disorder Maternal Grandfather         multiple DVTs    Alzheimer Paternal Grandmother     Alcohol abuse Paternal Grandfather     Hypertension Paternal Grandfather      Social History     Socioeconomic History    Marital status:      Spouse name: Not on file    Number of children: 0    Years of education: Not on file    Highest education level: Not on file   Occupational History    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       Employer: [employer]    Financial resource strain: Not on file    Food insecurity     Worry: Not on file     Inability: Not on file    Transportation needs     Medical: Not on file     Non-medical: Not on file   Tobacco Use    Smoking status: Never Smoker    Smokeless tobacco: Never Used   Substance and Sexual Activity    Alcohol use: Yes     Comment: 3-4 per month    Drug use: No    Sexual activity: Yes     Partners: Male     Birth control/protection: Inserts   Lifestyle    Physical activity Days per week: Not on file     Minutes per session: Not on file    Stress: Not on file   Relationships    Social connections     Talks on phone: Not on file     Gets together: Not on file     Attends Congregation service: Not on file     Active member of club or organization: Not on file     Attends meetings of clubs or organizations: Not on file     Relationship status: Not on file    Intimate partner violence     Fear of current or ex partner: Not on file     Emotionally abused: Not on file     Physically abused: Not on file     Forced sexual activity: Not on file   Other Topics Concern    Not on file   Social History Narrative    Not on file       Allergies   Allergen Reactions    Azithromycin Diarrhea     bloated  bloated    Clotrimazole-Betamethasone Other (comments)     Atrophy of the skin    Wellbutrin [Bupropion Hcl] Other (comments)     irritability       Current Outpatient Medications   Medication Sig    escitalopram oxalate (LEXAPRO) 5 mg tablet TAKE 1 TABLET BY MOUTH EVERY DAY    Adderall XR 10 mg XR capsule Take 1 Cap by mouth every morning. Max Daily Amount: 10 mg. BRAND MEDICALLY NECESSARY    montelukast (SINGULAIR) 10 mg tablet TAKE 1 TABLET BY MOUTH EVERY DAY    fluticasone furoate-vilanteroL (Breo Ellipta) 200-25 mcg/dose inhaler Take 1 Puff by inhalation daily.  mometasone-formoterol (DULERA) 100-5 mcg/actuation HFA inhaler Take 2 Puffs by inhalation two (2) times a day.  albuterol (PROAIR HFA) 90 mcg/actuation inhaler Take 1 Puff by inhalation every six (6) hours as needed for Wheezing or Shortness of Breath.  ethinyl estradiol-etonogestrel (NUVARING) 0.12-0.015 mg/24 hr vaginal ring Per month: Insert vaginally, leave in for three weeks, remove for one week    diphenhydrAMINE (BENADRYL) 25 mg capsule Take 25 mg by mouth every six (6) hours as needed.  cholecalciferol, VITAMIN D3, (VITAMIN D3) 5,000 unit tab tablet Take 1 Tab by mouth daily.     cetirizine (ZYRTEC) 10 mg tablet Take 10 mg by mouth daily.  methimazole (TAPAZOLE) 5 mg tablet Take 1 Tab by mouth daily. (Patient taking differently: Take 2.5 mg by mouth daily. Daily)    cyclobenzaprine (FLEXERIL) 5 mg tablet Take 1-2 tabs three times daily as needed for muscle spasm    ibuprofen (MOTRIN) 200 mg tablet Take 3 Tabs by mouth every eight (8) hours as needed for Pain. No current facility-administered medications for this visit. Patient Active Problem List   Diagnosis Code    Anxiety F41.9    Vitamin D deficiency E55.9    Hyperthyroidism E05.90    Environmental allergies Z91.09    Asthma J45.909    Headache(784.0) R51    Family history of ovarian cancer Z80.41    Breast lump on right side at 1 o'clock position N63.12    Large breasts N62    Severe obesity (BMI 35.0-39. 9) E66.01    Attention deficit hyperactivity disorder (ADHD), combined type F90.2    Food allergy Z91.018       Review of Systems - History obtained from the patient  Constitutional: negative for weight loss, fever, night sweats  HEENT: negative for hearing loss, earache, congestion, snoring, sorethroat  CV: negative for chest pain, palpitations, edema  Resp: negative for cough, shortness of breath, wheezing  GI: negative for change in bowel habits, abdominal pain, black or bloody stools  : negative for frequency, dysuria, hematuria, vaginal discharge  MSK: negative for back pain, joint pain, muscle pain  Breast: negative for breast lumps, nipple discharge, galactorrhea  Skin :negative for itching, rash, hives  Neuro: negative for dizziness, headache, confusion, weakness  Psych: negative for anxiety, depression, change in mood  Heme/lymph: negative for bleeding, bruising, pallor      (WWE continued)     Physical Exam  Visit Vitals  /89   Wt 220 lb (99.8 kg)   LMP 06/25/2020 (Exact Date)   BMI 36.61 kg/m²       Constitutional  · Appearance: well-nourished, well developed, alert, in no acute distress    HENT  · Head and Face: appears normal    Neck  · Inspection/Palpation: normal appearance, no masses or tenderness  · Lymph Nodes: no lymphadenopathy present  · Thyroid: gland size normal, nontender, no nodules or masses present on palpation    Chest  · Respiratory Effort: breathing unlabored  · Auscultation: normal breath sounds    Cardiovascular  · Heart:  · Auscultation: regular rate and rhythm without murmur    Breasts  · Inspection of Breasts: breasts symmetrical, no skin changes, no discharge present, nipple appearance normal, no skin retraction present  · Palpation of Breasts and Axillae: no masses present on palpation, no breast tenderness  · Axillary Lymph Nodes: no lymphadenopathy present    Gastrointestinal  · Abdominal Examination: abdomen non-tender to palpation, normal bowel sounds, no masses present  · Liver and spleen: no hepatomegaly present, spleen not palpable  · Hernias: no hernias identified    Genitourinary  · External Genitalia: normal appearance for age, no discharge present, no tenderness present, no inflammatory lesions present, no masses present, without atrophy present  · Vagina: normal vaginal vault without central or paravaginal defects, no discharge present, no inflammatory lesions present, no masses present  · Bladder: non-tender to palpation  · Urethra: appears normal  · Cervix: normal   · Uterus: normal size, shape and consistency  · Adnexa: no adnexal tenderness present, no adnexal masses present  · Perineum: perineum within normal limits, no evidence of trauma, no rashes or skin lesions present  · Anus: anus within normal limits, no hemorrhoids present  · Inguinal Lymph Nodes: no lymphadenopathy present    Skin  · General Inspection: no rash, no lesions identified    Neurologic/Psychiatric  · Mental Status:  · Orientation: grossly oriented to person, place and time  · Mood and Affect: mood normal, affect appropriate    Assessment:  41 y.o. No obstetric history on file.  for well woman exam  Encounter Diagnoses   Name Primary?  Encounter for gynecological examination (general) (routine) without abnormal findings Yes    Encounter for menstrual regulation        Plan:  The patient was counseled about diet, exercise, healthy lifestyle  We discussed current self breast exam and mammogram recommendations  We discussed current pap smear and HR HPV testing guidelines  We recommend follow up in one year for routine annual gynecologic exam or sooner if needed  We recommend follow up with a primary care physician for any chronic medical problems or non-gynecologic concerns    We discussed calcium/vitamin D/weight bearing exercise and osteoporosis prevention  Handouts were given to the patient  Discussed risks, benefits and alternatives of OCP/nuvaring/patch: including but not limited to dvt/pe/mi/cva/ca/gi risks and that smoking, increasing age and other health conditions can increase these risks. Folllow up:  [x] return for annual well woman exam in one year or sooner if she is having problems  [] follow up and ultrasound  [x] mammogram  [] 6 months  [] 3 months  [] 1 month    No orders of the defined types were placed in this encounter. No results found for any visits on 07/28/20.

## 2020-08-17 DIAGNOSIS — Z92.0: ICD-10-CM

## 2020-08-17 RX ORDER — ETONOGESTREL AND ETHINYL ESTRADIOL 11.7; 2.7 MG/1; MG/1
INSERT, EXTENDED RELEASE VAGINAL
Qty: 3 DEVICE | Refills: 4 | Status: SHIPPED | OUTPATIENT
Start: 2020-08-17 | End: 2021-09-07 | Stop reason: SDUPTHER

## 2020-08-25 DIAGNOSIS — F90.2 ATTENTION DEFICIT HYPERACTIVITY DISORDER (ADHD), COMBINED TYPE: ICD-10-CM

## 2020-08-25 RX ORDER — DEXTROAMPHETAMINE SULFATE, DEXTROAMPHETAMINE SACCHARATE, AMPHETAMINE SULFATE AND AMPHETAMINE ASPARTATE 2.5; 2.5; 2.5; 2.5 MG/1; MG/1; MG/1; MG/1
10 CAPSULE, EXTENDED RELEASE ORAL
Qty: 90 CAP | Refills: 0 | Status: SHIPPED | OUTPATIENT
Start: 2020-08-25 | End: 2020-11-25 | Stop reason: SDUPTHER

## 2020-11-10 RX ORDER — ALBUTEROL SULFATE 90 UG/1
1 AEROSOL, METERED RESPIRATORY (INHALATION)
Qty: 3 INHALER | Refills: 3 | Status: SHIPPED | OUTPATIENT
Start: 2020-11-10 | End: 2021-10-31

## 2020-11-25 DIAGNOSIS — F90.2 ATTENTION DEFICIT HYPERACTIVITY DISORDER (ADHD), COMBINED TYPE: ICD-10-CM

## 2020-11-25 RX ORDER — DEXTROAMPHETAMINE SULFATE, DEXTROAMPHETAMINE SACCHARATE, AMPHETAMINE SULFATE AND AMPHETAMINE ASPARTATE 2.5; 2.5; 2.5; 2.5 MG/1; MG/1; MG/1; MG/1
10 CAPSULE, EXTENDED RELEASE ORAL
Qty: 90 CAP | Refills: 0 | Status: SHIPPED | OUTPATIENT
Start: 2020-11-25 | End: 2021-02-14 | Stop reason: SDUPTHER

## 2021-01-13 ENCOUNTER — OFFICE VISIT (OUTPATIENT)
Dept: URGENT CARE | Age: 46
End: 2021-01-13
Payer: COMMERCIAL

## 2021-01-13 VITALS — RESPIRATION RATE: 17 BRPM | OXYGEN SATURATION: 98 % | TEMPERATURE: 99.2 F | HEART RATE: 84 BPM

## 2021-01-13 DIAGNOSIS — Z20.822 ENCOUNTER FOR LABORATORY TESTING FOR COVID-19 VIRUS: Primary | ICD-10-CM

## 2021-01-13 PROCEDURE — 99202 OFFICE O/P NEW SF 15 MIN: CPT | Performed by: NURSE PRACTITIONER

## 2021-01-13 RX ORDER — GUAIFENESIN 100 MG/5ML
81 LIQUID (ML) ORAL
COMMUNITY

## 2021-01-13 NOTE — PROGRESS NOTES
Subjective: (As above and below)       This patient was seen in [facility] at [address] [place] [address] [facility]gent [facility] while in their vehicle due to COVID-19 pandemic with PPE and focused examination in order to decrease community viral transmission. The patient/guardian gave verbal consent to treat. Chief Complaint   Patient presents with    Covid Testing     Pt. denies any symptoms. Pt. traveling to see family wants covid-19 test      Marco Fernandez is a 41 y.o. female who presents for COVID-19 testing  Required for: travel  Currently has not tried any therapies and denies any symptoms at this point in time. Feels well otherwise. Known Exposure to COVID-19: no  Denies any symptoms currently or recently. Review of Systems - negative except as listed above    Reviewed PmHx, RxHx, FmHx, SocHx, AllgHx and updated in chart. Family History   Problem Relation Age of Onset   [facility] Cancer Mother         cervical    Diabetes Mother    [facility] COPD Mother    [facility] Stroke Mother     Hypertension Mother    [facility] Diabetes Father     Hypertension Father     Stroke Father     Thyroid Disease Sister     Obesity Sister     Thyroid Disease Sister     Alzheimer Maternal Grandmother     COPD Maternal Grandfather     Clotting Disorder Maternal Grandfather         multiple DVTs    Alzheimer Paternal Grandmother     Alcohol abuse Paternal Grandfather     Hypertension Paternal Grandfather        Past Medical History:   Diagnosis Date    ADHD (attention deficit hyperactivity disorder) 2008    adult.  Allergy to mold spores     Anxiety 3/2012    first rx age 33 5    Asthma     mild.  Breast nodule 3/2014    R 12:00.  dense tissue. Dr. Mariah Martinez Environmental allergies     hx shots as a child    Family history of skin cancer     maternal aunt and uncle    Food allergy     garlic (test positive), eggs (but tested neg)    Graves disease 2012    with toxic MNG.   Dr. Dotson Head H/O mammogram 05/11/2017; 7/31/18; 8/05/19 Negative; Negative; negative    Hx of mammogram 09/09/2020    Benign, BiRads-2 fibroglandular densities     WAGNER (obstructive sleep apnea)     mild. Dr. Dali Cm. dental appliance. 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 Employer: [employer]   Tobacco Use    Smoking status: Never Smoker    Smokeless tobacco: Never Used   Substance and Sexual Activity    Alcohol use: Yes     Comment: 3-4 per month    Drug use: No    Sexual activity: Yes     Partners: Male     Birth control/protection: Inserts          Current Outpatient Medications   Medication Sig    aspirin 81 mg chewable tablet Take 81 mg by mouth Every Mon, Wed & Sun.    Adderall XR 10 mg XR capsule Take 1 Cap by mouth every morning. Max Daily Amount: 10 mg. BRAND MEDICALLY NECESSARY    albuterol (ProAir HFA) 90 mcg/actuation inhaler Take 1 Puff by inhalation every six (6) hours as needed for Wheezing or Shortness of Breath.  ethinyl estradiol-etonogestrel (NUVARING) 0.12-0.015 mg/24 hr vaginal ring Per month: Insert vaginally, leave in for three weeks, remove for one week    escitalopram oxalate (LEXAPRO) 5 mg tablet TAKE 1 TABLET BY MOUTH EVERY DAY    montelukast (SINGULAIR) 10 mg tablet TAKE 1 TABLET BY MOUTH EVERY DAY    fluticasone furoate-vilanteroL (Breo Ellipta) 200-25 mcg/dose inhaler Take 1 Puff by inhalation daily.  cyclobenzaprine (FLEXERIL) 5 mg tablet Take 1-2 tabs three times daily as needed for muscle spasm    ibuprofen (MOTRIN) 200 mg tablet Take 3 Tabs by mouth every eight (8) hours as needed for Pain.     mometasone-formoterol (DULERA) 100-5 mcg/actuation HFA inhaler Take 2 Puffs by inhalation two (2) times a day.  diphenhydrAMINE (BENADRYL) 25 mg capsule Take 25 mg by mouth every six (6) hours as needed.  cholecalciferol, VITAMIN D3, (VITAMIN D3) 5,000 unit tab tablet Take 1 Tab by mouth daily.  cetirizine (ZYRTEC) 10 mg tablet Take 10 mg by mouth daily.  methimazole (TAPAZOLE) 5 mg tablet Take 1 Tab by mouth daily. (Patient taking differently: Take 2.5 mg by mouth daily. Daily)     No current facility-administered medications for this visit. Objective:     Vitals:    01/13/21 0831   Pulse: 84   Resp: 17   Temp: 99.2 °F (37.3 °C)   SpO2: 98%       Physical Exam  General appearance  appears well hydrated and does not appear toxic, no acute distress  Eyes - EOMs intact. Non injected. No scleral icterus   Ears - no external swelling  Nose - No purulent drainage  Neck/Lymphatics  trachea midline, no obvious swelling  Chest - normal breathing effort. No active cough heard. No audible wheezing. Heart - HR-see vitals  Skin - no observable rashes or pallor  Neurologic- alert and oriented x 3  Psychiatric- normal mood, behavior and though content. Assessment/ Plan:     1. Encounter for laboratory testing for COVID-19 virus    - NOVEL CORONAVIRUS (COVID-19)      Will test for COVID  Should follow CDC guidelines for self isolation for any positive results.       Tanya Núñez NP

## 2021-01-15 LAB — SARS-COV-2, NAA: NOT DETECTED

## 2021-01-20 DIAGNOSIS — F41.9 ANXIETY: ICD-10-CM

## 2021-01-20 RX ORDER — ESCITALOPRAM OXALATE 5 MG/1
TABLET ORAL
Qty: 90 TAB | Refills: 1 | Status: SHIPPED | OUTPATIENT
Start: 2021-01-20 | End: 2021-08-30 | Stop reason: SDUPTHER

## 2021-02-14 DIAGNOSIS — F90.2 ATTENTION DEFICIT HYPERACTIVITY DISORDER (ADHD), COMBINED TYPE: ICD-10-CM

## 2021-02-14 RX ORDER — DEXTROAMPHETAMINE SULFATE, DEXTROAMPHETAMINE SACCHARATE, AMPHETAMINE SULFATE AND AMPHETAMINE ASPARTATE 2.5; 2.5; 2.5; 2.5 MG/1; MG/1; MG/1; MG/1
10 CAPSULE, EXTENDED RELEASE ORAL
Qty: 90 CAP | Refills: 0 | Status: SHIPPED | OUTPATIENT
Start: 2021-02-14 | End: 2021-05-29 | Stop reason: SDUPTHER

## 2021-02-15 ENCOUNTER — OFFICE VISIT (OUTPATIENT)
Dept: URGENT CARE | Age: 46
End: 2021-02-15
Payer: COMMERCIAL

## 2021-02-15 VITALS — OXYGEN SATURATION: 98 % | RESPIRATION RATE: 15 BRPM | TEMPERATURE: 98.3 F | HEART RATE: 89 BPM

## 2021-02-15 DIAGNOSIS — Z20.822 ENCOUNTER FOR LABORATORY TESTING FOR COVID-19 VIRUS: Primary | ICD-10-CM

## 2021-02-15 PROCEDURE — 99212 OFFICE O/P EST SF 10 MIN: CPT | Performed by: NURSE PRACTITIONER

## 2021-02-15 NOTE — PROGRESS NOTES
Subjective: (As above and below)       This patient was seen in [facility] at [address] [place] [address] [facility] while in their vehicle due to COVID-19 pandemic with PPE and focused examination in order to decrease community viral transmission. The patient/guardian gave verbal consent to treat. Chief Complaint   Patient presents with    Concern For COVID-19 (Coronavirus)     Anabel Frenandez is a 41 y.o. female who presents for COVID-19 testing  Required for: travel  Currently has not tried any therapies and denies any symptoms at this point in time. Feels well otherwise. Known Exposure to COVID-19: no  Denies any symptoms currently or recently. Review of Systems - negative except as listed above    Reviewed PmHx, RxHx, FmHx, SocHx, AllgHx and updated in chart. Family History   Problem Relation Age of Onset   [facility] Cancer Mother         cervical    Diabetes Mother    [facility] COPD Mother    [facility] Stroke Mother     Hypertension Mother    [facility] Diabetes Father     Hypertension Father     Stroke Father     Thyroid Disease Sister     Obesity Sister     Thyroid Disease Sister     Alzheimer Maternal Grandmother     COPD Maternal Grandfather     Clotting Disorder Maternal Grandfather         multiple DVTs    Alzheimer Paternal Grandmother     Alcohol abuse Paternal Grandfather     Hypertension Paternal Grandfather        Past Medical History:   Diagnosis Date    ADHD (attention deficit hyperactivity disorder) 2008    adult.  Allergy to mold spores     Anxiety 3/2012    first rx age 33 5    Asthma     mild.  Breast nodule 3/2014    R 12:00.  dense tissue. Dr. Deisy Uriarte Environmental allergies     hx shots as a child    Family history of skin cancer     maternal aunt and uncle    Food allergy     garlic (test positive), eggs (but tested neg)    Graves disease 2012    with toxic MNG.   Dr. Lianne Lopez H/O mammogram 05/11/2017; 7/31/18; 8/05/19    Negative; Negative; negative    Hx of mammogram 09/09/2020    Benign, BiRads-2 fibroglandular densities     WAGNER (obstructive sleep apnea)     mild. Dr. Javier Meredith. dental appliance. 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 Employer: [employer]   Tobacco Use    Smoking status: Never Smoker    Smokeless tobacco: Never Used   Substance and Sexual Activity    Alcohol use: Yes     Comment: 3-4 per month    Drug use: No    Sexual activity: Yes     Partners: Male     Birth control/protection: Inserts          Current Outpatient Medications   Medication Sig    Adderall XR 10 mg XR capsule Take 1 Cap by mouth every morning. Max Daily Amount: 10 mg. BRAND MEDICALLY NECESSARY    escitalopram oxalate (LEXAPRO) 5 mg tablet TAKE 1 TABLET BY MOUTH EVERY DAY    aspirin 81 mg chewable tablet Take 81 mg by mouth Every Mon, Wed & Sun.    albuterol (ProAir HFA) 90 mcg/actuation inhaler Take 1 Puff by inhalation every six (6) hours as needed for Wheezing or Shortness of Breath.  ethinyl estradiol-etonogestrel (NUVARING) 0.12-0.015 mg/24 hr vaginal ring Per month: Insert vaginally, leave in for three weeks, remove for one week    montelukast (SINGULAIR) 10 mg tablet TAKE 1 TABLET BY MOUTH EVERY DAY    fluticasone furoate-vilanteroL (Breo Ellipta) 200-25 mcg/dose inhaler Take 1 Puff by inhalation daily.  cyclobenzaprine (FLEXERIL) 5 mg tablet Take 1-2 tabs three times daily as needed for muscle spasm    ibuprofen (MOTRIN) 200 mg tablet Take 3 Tabs by mouth every eight (8) hours as needed for Pain.     mometasone-formoterol (DULERA) 100-5 mcg/actuation HFA inhaler Take 2 Puffs by inhalation two (2) times a day.  diphenhydrAMINE (BENADRYL) 25 mg capsule Take 25 mg by mouth every six (6) hours as needed.  cholecalciferol, VITAMIN D3, (VITAMIN D3) 5,000 unit tab tablet Take 1 Tab by mouth daily.  cetirizine (ZYRTEC) 10 mg tablet Take 10 mg by mouth daily.  methimazole (TAPAZOLE) 5 mg tablet Take 1 Tab by mouth daily. (Patient taking differently: Take 2.5 mg by mouth daily. Daily)     No current facility-administered medications for this visit. Objective:     Vitals:    02/15/21 1118   Pulse: 89   Resp: 15   Temp: 98.3 °F (36.8 °C)   SpO2: 98%       Physical Exam  General appearance  appears well hydrated and does not appear toxic, no acute distress  Eyes - EOMs intact. Non injected. No scleral icterus   Ears - no external swelling  Neck/Lymphatics  trachea midline, no obvious swelling  Chest - normal breathing effort. No active cough heard. No audible wheezing. Heart - HR-see vitals  Skin - no observable rashes or pallor  Neurologic- alert and oriented x 3  Psychiatric- normal mood, behavior and though content. Assessment/ Plan:     1. Encounter for laboratory testing for COVID-19 virus    - NOVEL CORONAVIRUS (COVID-19)      Will test for COVID  Should follow CDC guidelines for self isolation for any positive results.       Dilcia Carey NP

## 2021-02-17 LAB — SARS-COV-2, NAA: NOT DETECTED

## 2021-03-31 ENCOUNTER — OFFICE VISIT (OUTPATIENT)
Dept: INTERNAL MEDICINE CLINIC | Age: 46
End: 2021-03-31
Payer: COMMERCIAL

## 2021-03-31 VITALS
WEIGHT: 220 LBS | SYSTOLIC BLOOD PRESSURE: 136 MMHG | BODY MASS INDEX: 36.65 KG/M2 | HEART RATE: 66 BPM | DIASTOLIC BLOOD PRESSURE: 88 MMHG | OXYGEN SATURATION: 98 % | RESPIRATION RATE: 16 BRPM | HEIGHT: 65 IN | TEMPERATURE: 98.1 F

## 2021-03-31 DIAGNOSIS — Z00.00 WELL ADULT EXAM: Primary | ICD-10-CM

## 2021-03-31 DIAGNOSIS — E55.9 VITAMIN D DEFICIENCY: ICD-10-CM

## 2021-03-31 DIAGNOSIS — E05.90 HYPERTHYROIDISM: ICD-10-CM

## 2021-03-31 DIAGNOSIS — E78.2 MIXED HYPERLIPIDEMIA: ICD-10-CM

## 2021-03-31 DIAGNOSIS — R73.02 IMPAIRED GLUCOSE TOLERANCE: ICD-10-CM

## 2021-03-31 DIAGNOSIS — Z11.59 ENCOUNTER FOR HEPATITIS C SCREENING TEST FOR LOW RISK PATIENT: ICD-10-CM

## 2021-03-31 LAB
25(OH)D3 SERPL-MCNC: 64.2 NG/ML (ref 30–100)
ALBUMIN SERPL-MCNC: 3.7 G/DL (ref 3.5–5)
ALBUMIN/GLOB SERPL: 1 {RATIO} (ref 1.1–2.2)
ALP SERPL-CCNC: 68 U/L (ref 45–117)
ALT SERPL-CCNC: 16 U/L (ref 12–78)
ANION GAP SERPL CALC-SCNC: 6 MMOL/L (ref 5–15)
AST SERPL-CCNC: 10 U/L (ref 15–37)
BASOPHILS # BLD: 0.1 K/UL (ref 0–0.1)
BASOPHILS NFR BLD: 1 % (ref 0–1)
BILIRUB SERPL-MCNC: 0.7 MG/DL (ref 0.2–1)
BUN SERPL-MCNC: 13 MG/DL (ref 6–20)
BUN/CREAT SERPL: 19 (ref 12–20)
CALCIUM SERPL-MCNC: 9.6 MG/DL (ref 8.5–10.1)
CHLORIDE SERPL-SCNC: 108 MMOL/L (ref 97–108)
CHOLEST SERPL-MCNC: 265 MG/DL
CO2 SERPL-SCNC: 24 MMOL/L (ref 21–32)
CREAT SERPL-MCNC: 0.69 MG/DL (ref 0.55–1.02)
CRP SERPL HS-MCNC: 9.4 MG/L
DIFFERENTIAL METHOD BLD: ABNORMAL
EOSINOPHIL # BLD: 0.3 K/UL (ref 0–0.4)
EOSINOPHIL NFR BLD: 4 % (ref 0–7)
ERYTHROCYTE [DISTWIDTH] IN BLOOD BY AUTOMATED COUNT: 12.8 % (ref 11.5–14.5)
FOLATE SERPL-MCNC: 18.3 NG/ML (ref 5–21)
GLOBULIN SER CALC-MCNC: 3.6 G/DL (ref 2–4)
GLUCOSE SERPL-MCNC: 101 MG/DL (ref 65–100)
HCT VFR BLD AUTO: 43 % (ref 35–47)
HDLC SERPL-MCNC: 75 MG/DL
HDLC SERPL: 3.5 {RATIO} (ref 0–5)
HGB BLD-MCNC: 13.4 G/DL (ref 11.5–16)
IMM GRANULOCYTES # BLD AUTO: 0 K/UL (ref 0–0.04)
IMM GRANULOCYTES NFR BLD AUTO: 0 % (ref 0–0.5)
LDLC SERPL CALC-MCNC: 154.6 MG/DL (ref 0–100)
LIPID PROFILE,FLP: ABNORMAL
LYMPHOCYTES # BLD: 3.3 K/UL (ref 0.8–3.5)
LYMPHOCYTES NFR BLD: 47 % (ref 12–49)
MCH RBC QN AUTO: 27.9 PG (ref 26–34)
MCHC RBC AUTO-ENTMCNC: 31.2 G/DL (ref 30–36.5)
MCV RBC AUTO: 89.4 FL (ref 80–99)
MONOCYTES # BLD: 0.3 K/UL (ref 0–1)
MONOCYTES NFR BLD: 4 % (ref 5–13)
NEUTS SEG # BLD: 3.1 K/UL (ref 1.8–8)
NEUTS SEG NFR BLD: 44 % (ref 32–75)
NRBC # BLD: 0 K/UL (ref 0–0.01)
NRBC BLD-RTO: 0 PER 100 WBC
PLATELET # BLD AUTO: 432 K/UL (ref 150–400)
PMV BLD AUTO: 9.7 FL (ref 8.9–12.9)
POTASSIUM SERPL-SCNC: 4.6 MMOL/L (ref 3.5–5.1)
PROT SERPL-MCNC: 7.3 G/DL (ref 6.4–8.2)
RBC # BLD AUTO: 4.81 M/UL (ref 3.8–5.2)
SODIUM SERPL-SCNC: 138 MMOL/L (ref 136–145)
TRIGL SERPL-MCNC: 177 MG/DL (ref ?–150)
VIT B12 SERPL-MCNC: 393 PG/ML (ref 193–986)
VLDLC SERPL CALC-MCNC: 35.4 MG/DL
WBC # BLD AUTO: 7.1 K/UL (ref 3.6–11)

## 2021-03-31 PROCEDURE — 99213 OFFICE O/P EST LOW 20 MIN: CPT | Performed by: NURSE PRACTITIONER

## 2021-03-31 PROCEDURE — 99396 PREV VISIT EST AGE 40-64: CPT | Performed by: NURSE PRACTITIONER

## 2021-03-31 NOTE — PROGRESS NOTES
Subjective:   41 y.o. female for Well Woman Check. Her gyne and breast care is done elsewhere by her Ob-Gyne physician. Sees Dr Kathy Harris and had last visit in July 2020 and had normal mammogram in 9/2020. Has received a Jose David and [employer] vaccine this month. Her Graves disease is followed by Dr Kathrin Matthew and she has next appointment in April 2021. Has been stable. Has been working with a virtual group [employer] Type 2 Diabetes prevention through [place] and has [employer] once a week for 6 months then monthly after that. Has been working on her diet and exercise and is feeling positive about her participation in this group. She has been working on lowering her cholesterol as well; routinely has labs checked with Endocrinology and her lipid level was very high in 8/2020 with , Tri 211, HDL 59, VLDL 40, . Reports that Dr Kathrin Matthew wanted to start her on statin but she wanted to try to work on her diet and exercise regimen before considering this and she has been able to bring her numbers down. Has been Vitamin D deficient in the past and she is taking supplements daily at 5000 units of Vitamin D3. Her CRP-HS was elevated last year but her homocysteine level was normal.  Will recheck. Has medication management appointment with Dr Tia Powell on 4/2/2021. Current Outpatient Medications   Medication Sig Dispense Refill    Adderall XR 10 mg XR capsule Take 1 Cap by mouth every morning. Max Daily Amount: 10 mg. BRAND MEDICALLY NECESSARY 90 Cap 0    escitalopram oxalate (LEXAPRO) 5 mg tablet TAKE 1 TABLET BY MOUTH EVERY DAY 90 Tab 1    aspirin 81 mg chewable tablet Take 81 mg by mouth Every Mon, Wed & Sun.      albuterol (ProAir HFA) 90 mcg/actuation inhaler Take 1 Puff by inhalation every six (6) hours as needed for Wheezing or Shortness of Breath. 3 Inhaler 3    ethinyl estradiol-etonogestrel (NUVARING) 0.12-0.015 mg/24 hr vaginal ring Per month:  Insert vaginally, leave in for three weeks, remove for one week 3 Device 4    montelukast (SINGULAIR) 10 mg tablet TAKE 1 TABLET BY MOUTH EVERY DAY 90 Tab 3    fluticasone furoate-vilanteroL (Breo Ellipta) 200-25 mcg/dose inhaler Take 1 Puff by inhalation daily. 1 Each 2    ibuprofen (MOTRIN) 200 mg tablet Take 3 Tabs by mouth every eight (8) hours as needed for Pain. 30 Tab 0    diphenhydrAMINE (BENADRYL) 25 mg capsule Take 25 mg by mouth every six (6) hours as needed.  cholecalciferol, VITAMIN D3, (VITAMIN D3) 5,000 unit tab tablet Take 1 Tab by mouth daily. 30 Tab 5    cetirizine (ZYRTEC) 10 mg tablet Take 10 mg by mouth daily.  methimazole (TAPAZOLE) 5 mg tablet Take 1 Tab by mouth daily. (Patient taking differently: Take 2.5 mg by mouth daily. Daily) 90 Tab 0     Allergies   Allergen Reactions    Azithromycin Diarrhea     bloated  bloated    Clotrimazole-Betamethasone Other (comments)     Atrophy of the skin    Wellbutrin [Bupropion Hcl] Other (comments)     irritability     Past Medical History:   Diagnosis Date    ADHD (attention deficit hyperactivity disorder) 2008    adult.  Allergy to mold spores     Anxiety 3/2012    first rx age 33 5    Asthma     mild.  Breast nodule 3/2014    R 12:00.  dense tissue. Dr. Yusuf Veloz Environmental allergies     hx shots as a child    Family history of skin cancer     maternal aunt and uncle    Food allergy     garlic (test positive), eggs (but tested neg)    Graves disease 2012    with toxic MNG. Dr. Deborah Resendiz H/O mammogram 05/11/2017; 7/31/18; 8/05/19    Negative; Negative; negative    Hx of mammogram 09/09/2020    Benign, BiRads-2 fibroglandular densities     WAGNER (obstructive sleep apnea)     mild. Dr. Giovanna Curtis. dental appliance.  Pap smear for cervical cancer screening 06/11/2019    Neg/Neg HPV    Vitamin D deficiency      Past Surgical History:   Procedure Laterality Date    HX ENDOSCOPY  2005?     HX KNEE ARTHROSCOPY  2003    right    HX WISDOM TEETH EXTRACTION       Family History   Problem Relation Age of Onset   [facility] Cancer Mother         cervical    Diabetes Mother    [facility] COPD Mother    [facility] Stroke Mother     Hypertension Mother    [facility] Diabetes Father     Hypertension Father     Stroke Father     Thyroid Disease Sister     Obesity Sister     Thyroid Disease Sister     Alzheimer Maternal Grandmother     COPD Maternal Grandfather     Clotting Disorder Maternal Grandfather         multiple DVTs    Alzheimer Paternal Grandmother     Alcohol abuse Paternal Grandfather     Hypertension Paternal Grandfather      Social History     Tobacco Use    Smoking status: Never Smoker    Smokeless tobacco: Never Used   Substance Use Topics    Alcohol use: Yes     Frequency: Monthly or less     Drinks per session: 1 or 2     Binge frequency: Never     Comment: 3-4 per month             ROS: Feeling generally well. No TIA's or unusual headaches, no dysphagia. No prolonged cough. No dyspnea or chest pain on exertion. No abdominal pain, change in bowel habits, black or bloody stools. No urinary tract symptoms. No new or unusual musculoskeletal symptoms. Specific concerns today: none. Objective: The patient appears well, alert, oriented x 3, in no distress. Visit Vitals  /88 (BP 1 Location: Left upper arm, BP Patient Position: Sitting, BP Cuff Size: Adult)   Pulse 66   Temp 98.1 °F (36.7 °C) (Oral)   Resp 16   Ht 5' 5\" (1.651 m)   Wt 220 lb (99.8 kg)   LMP 02/24/2021 (Approximate)   SpO2 98%   BMI 36.61 kg/m²    blood pressure recheck with manual cuff 128/84    ENT normal.  Neck supple. No adenopathy or thyromegaly. GENIA. Lungs are clear, good air entry, no wheezes, rhonchi or rales. S1 and S2 normal, no murmurs, regular rate and rhythm. Abdomen soft without tenderness, guarding, mass or organomegaly. Extremities show no edema, normal peripheral pulses. Neurological is normal, no focal findings.   Breast and Pelvic exams are deferred. Assessment/Plan:   Well Woman  lose weight, increase physical activity, routine labs ordered  Diagnoses and all orders for this visit:    1. Well adult exam  -     CBC WITH AUTOMATED DIFF; Future  -     METABOLIC PANEL, COMPREHENSIVE; Future  -     VITAMIN B12 & FOLATE; Future    2. Hyperthyroidism - managed by endocrinology    3. Impaired glucose tolerance -- has been working with a virtual group and is excited about participation. Will have labs drawn through Endocrinology office  -     METABOLIC PANEL, COMPREHENSIVE; Future    4. Mixed hyperlipidemia -- has been working on getting her numbers down via diet and exercise and has been successful thus far. Discussed possible Coronary calcium CT scan to assess for calcified plaque and cardiac risk.  -     LIPID PANEL; Future  -     CRP, HIGH SENSITIVITY; Future    5. Vitamin D deficiency  -     VITAMIN D, 25 HYDROXY; Future    6. Encounter for hepatitis C screening test for low risk patient  -     HEPATITIS C AB; Future    Hilaria Fernandez was counseled on age-appropriate/ guideline-based risk prevention behaviors and screening for a 41y.o. year old   female . We also discussed adjustments in screening based on family history if necessary. Printed instructions for preventative screening guidelines and healthy behaviors given to patient with after visit summary.         lab results and schedule of future lab studies reviewed with patient  reviewed diet, exercise and weight control  cardiovascular risk and specific lipid/LDL goals reviewed  reviewed medications and side effects in detail

## 2021-03-31 NOTE — PATIENT INSTRUCTIONS
Learning About Vitamin D Why is it important to get enough vitamin D? Your body needs vitamin D to absorb calcium. Calcium keeps your bones and muscles, including your heart, healthy and strong. If your muscles don't get enough calcium, they can cramp, hurt, or feel weak. You may have long-term (chronic) muscle aches and pains. If you don't get enough vitamin D throughout life, you have an increased chance of having thin and brittle bones (osteoporosis) in your later years. Children who don't get enough vitamin D may not grow as much as others their age. They also have a chance of getting a rare disease called rickets. It causes weak bones. Vitamin D and calcium are added to many foods. And your body uses sunshine to make its own vitamin D. How much vitamin D do you need? The [place] [employer] recommends that people ages 3 through 79 get 600 IU (international units) every day. Adults 71 and older need 800 IU every day. Blood tests for vitamin D can check your vitamin D level. But there is no standard normal range used by all laboratories. You're likely getting enough vitamin D if your levels are in the range of 20 to 50 ng/mL. How can you get more vitamin D? Foods that contain vitamin D include: 
· [place], tuna, and mackerel. These are some of the best foods to eat when you need to get more vitamin D. 
· Cheese, egg yolks, and beef liver. These foods have vitamin D in small amounts. · Milk, soy drinks, orange juice, yogurt, margarine, and some kinds of cereal have vitamin D added to them. Some people don't make vitamin D as well as others. They may have to take extra care in getting enough vitamin D. Things that reduce how much vitamin D your body makes include: · Dark skin, such as many  Americans have. · Age, especially if you are older than 72. · Digestive problems, such as Crohn's or celiac disease. · Liver and kidney disease.  
Some people who do not get enough vitamin D may need supplements. Are there any risks from taking vitamin D? 
· Too much vitamin D: 
? Can damage your kidneys. ? Can cause nausea and vomiting, constipation, and weakness. ? Raises the amount of calcium in your blood. If this happens, you can get confused or have an irregular heart rhythm. · Vitamin D may interact with other medicines. Tell your doctor about all of the medicines you take, including over-the-counter drugs, herbs, and pills. Tell your doctor about all of your current medical problems. Where can you learn more? Go to http://www.Appinions/ Enter [id number] in the search box to learn more about \"Learning About Vitamin D.\" 
Current as of: August 22, 2019               Content Version: 12.6 © 3370-3050 [employer]. Care instructions adapted under license by [employer] (which disclaims liability or warranty for this information). If you have questions about a medical condition or this instruction, always ask your healthcare professional. [place] [address] any warranty or liability for your use of this information. Well Visit, Ages 25 to 48: Care Instructions Your Care Instructions Physical exams can help you stay healthy. Your doctor has checked your overall health and may have suggested ways to take good care of yourself. He or she also may have recommended tests. At home, you can help prevent illness with healthy eating, regular exercise, and other steps. Follow-up care is a key part of your treatment and safety. Be sure to make and go to all appointments, and call your doctor if you are having problems. It's also a good idea to know your test results and keep a list of the medicines you take. How can you care for yourself at home? · Reach and stay at a healthy weight. This will lower your risk for many problems, such as obesity, diabetes, heart disease, and high blood pressure.  
· Get at least 30 minutes of physical activity on most days of the week. Walking is a good choice. You also may want to do other activities, such as running, swimming, cycling, or playing tennis or team sports. Discuss any changes in your exercise program with your doctor. · Do not smoke or allow others to smoke around you. If you need help quitting, talk to your doctor about stop-smoking programs and medicines. These can increase your chances of quitting for good. · Talk to your doctor about whether you have any risk factors for sexually transmitted infections (STIs). Having one sex partner (who does not have STIs and does not have sex with anyone else) is a good way to avoid these infections. · Use birth control if you do not want to have children at this time. Talk with your doctor about the choices available and what might be best for you. · Protect your skin from too much sun. When you're outdoors from 10 a.m. to 4 p.m., stay in the shade or cover up with clothing and a hat with a wide brim. Wear sunglasses that block UV rays. Even when it's cloudy, put broad-spectrum sunscreen (SPF 30 or higher) on any exposed skin. · See a dentist one or two times a year for checkups and to have your teeth cleaned. · Wear a seat belt in the car. Follow your doctor's advice about when to have certain tests. These tests can spot problems early. For everyone · Cholesterol. Have the fat (cholesterol) in your blood tested after age 21. Your doctor will tell you how often to have this done based on your age, family history, or other things that can increase your risk for heart disease. · Blood pressure. Have your blood pressure checked during a routine doctor visit. Your doctor will tell you how often to check your blood pressure based on your age, your blood pressure results, and other factors. · Vision. Talk with your doctor about how often to have a glaucoma test. 
· Diabetes. Ask your doctor whether you should have tests for diabetes. · Colon cancer. Your risk for colorectal cancer gets higher as you get older. Some experts say that adults should start regular screening at age 50 and stop at age 75. Others say to start before age 50 or continue after age 75. Talk with your doctor about your risk and when to start and stop screening. 
For women 
· Breast exam and mammogram. Talk to your doctor about when you should have a clinical breast exam and a mammogram. Medical experts differ on whether and how often women under 50 should have these tests. Your doctor can help you decide what is right for you. 
· Cervical cancer screening test and pelvic exam. Begin with a Pap test at age 21. The test often is part of a pelvic exam. Starting at age 30, you may choose to have a Pap test, an HPV test, or both tests at the same time (called co-testing). Talk with your doctor about how often to have testing. 
· Tests for sexually transmitted infections (STIs). Ask whether you should have tests for STIs. You may be at risk if you have sex with more than one person, especially if your partners do not wear condoms. 
For men 
· Tests for sexually transmitted infections (STIs). Ask whether you should have tests for STIs. You may be at risk if you have sex with more than one person, especially if you do not wear a condom. 
· Testicular cancer exam. Ask your doctor whether you should check your testicles regularly. 
· Prostate exam. Talk to your doctor about whether you should have a blood test (called a PSA test) for prostate cancer. Experts differ on whether and when men should have this test. Some experts suggest it if you are older than 45 and are -American or have a father or brother who got prostate cancer when he was younger than 65. 
When should you call for help? 
Watch closely for changes in your health, and be sure to contact your doctor if you have any problems or symptoms that concern you. 
Where can you learn more? 
Go to  http://www.gray.com/ Enter P072 in the search box to learn more about \"Well Visit, Ages 25 to 48: Care Instructions. \" Current as of: May 27, 2020               Content Version: 12.6 © 2006-2020 [employer]. Care instructions adapted under license by [employer] (which disclaims liability or warranty for this information). If you have questions about a medical condition or this instruction, always ask your healthcare professional. [address] any warranty or liability for your use of this information. High Cholesterol: Care Instructions Your Care Instructions Cholesterol is a type of fat in your blood. It is needed for many body functions, such as making new cells. Cholesterol is made by your body. It also comes from food you eat. High cholesterol means that you have too much of the fat in your blood. This raises your risk of a heart attack and stroke. LDL and HDL are part of your total cholesterol. LDL is the \"bad\" cholesterol. High LDL can raise your risk for heart disease, heart attack, and stroke. HDL is the \"good\" cholesterol. It helps clear bad cholesterol from the body. High HDL is linked with a lower risk of heart disease, heart attack, and stroke. Your cholesterol levels help your doctor find out your risk for having a heart attack or stroke. You and your doctor can talk about whether you need to lower your risk and what treatment is best for you. A heart-healthy lifestyle along with medicines can help lower your cholesterol and your risk. The way you choose to lower your risk will depend on how high your risk is for heart attack and stroke. It will also depend on how you feel about taking medicines. Follow-up care is a key part of your treatment and safety. Be sure to make and go to all appointments, and call your doctor if you are having problems.  It's also a good idea to know your test results and keep a list of the medicines you take. How can you care for yourself at home? · Eat a variety of foods every day. Good choices include fruits, vegetables, whole grains (like oatmeal), dried beans and peas, nuts and seeds, soy products (like tofu), and fat-free or low-fat dairy products. · Replace butter, margarine, and hydrogenated or partially hydrogenated oils with olive and canola oils. (Canola oil margarine without trans fat is fine.) · Replace red meat with fish, poultry, and soy protein (like tofu). · Limit processed and packaged foods like chips, crackers, and cookies. · Bake, broil, or steam foods. Don't andersen them. · Be physically active. Get at least 30 minutes of exercise on most days of the week. Walking is a good choice. You also may want to do other activities, such as running, swimming, cycling, or playing tennis or team sports. · Stay at a healthy weight or lose weight by making the changes in eating and physical activity listed above. Losing just a small amount of weight, even 5 to 10 pounds, can reduce your risk for having a heart attack or stroke. · Do not smoke. When should you call for help? Watch closely for changes in your health, and be sure to contact your doctor if: 
  · You need help making lifestyle changes.  
  · You have questions about your medicine. Where can you learn more? Go to http://www.gray.com/ Enter N836 in the search box to learn more about \"High Cholesterol: Care Instructions. \" Current as of: December 16, 2019               Content Version: 12.6 © 3944-5870 [employer]. Care instructions adapted under license by [employer] (which disclaims liability or warranty for this information). If you have questions about a medical condition or this instruction, always ask your healthcare professional. [address] any warranty or liability for your use of this information.

## 2021-04-02 ENCOUNTER — OFFICE VISIT (OUTPATIENT)
Dept: INTERNAL MEDICINE CLINIC | Age: 46
End: 2021-04-02
Payer: COMMERCIAL

## 2021-04-02 VITALS
WEIGHT: 224.4 LBS | DIASTOLIC BLOOD PRESSURE: 86 MMHG | HEIGHT: 65 IN | RESPIRATION RATE: 14 BRPM | OXYGEN SATURATION: 100 % | BODY MASS INDEX: 37.39 KG/M2 | HEART RATE: 63 BPM | SYSTOLIC BLOOD PRESSURE: 136 MMHG

## 2021-04-02 DIAGNOSIS — R79.82 CRP ELEVATED: ICD-10-CM

## 2021-04-02 DIAGNOSIS — E66.01 SEVERE OBESITY WITH BODY MASS INDEX (BMI) OF 35.0 TO 39.9 WITH SERIOUS COMORBIDITY (HCC): ICD-10-CM

## 2021-04-02 DIAGNOSIS — E78.2 MIXED HYPERLIPIDEMIA: Primary | ICD-10-CM

## 2021-04-02 DIAGNOSIS — F90.2 ATTENTION DEFICIT HYPERACTIVITY DISORDER (ADHD), COMBINED TYPE: ICD-10-CM

## 2021-04-02 DIAGNOSIS — F41.9 ANXIETY: ICD-10-CM

## 2021-04-02 PROCEDURE — 99213 OFFICE O/P EST LOW 20 MIN: CPT | Performed by: INTERNAL MEDICINE

## 2021-04-02 RX ORDER — ATORVASTATIN CALCIUM 10 MG/1
10 TABLET, FILM COATED ORAL DAILY
Qty: 90 TAB | Refills: 1 | Status: SHIPPED | OUTPATIENT
Start: 2021-04-02 | End: 2021-10-11 | Stop reason: ALTCHOICE

## 2021-04-02 NOTE — PROGRESS NOTES
HISTORY OF PRESENT ILLNESS    Chief Complaint   Patient presents with    Medication Evaluation    Results     Labs. Presents for follow-up    Moving back to [place] 4/30/21. Father Parkinson's, mother w hx stroke. Plans to travel back and forth. Patient is seen for anxiety disorder and ADD. Current treatment includes Lexapro  5 mg and Adderall XR 10 mg and seeing a therapist Keyanna Xavier. Ongoing symptoms include: anxiety about moving  Patient denies: sweating, chest pain, dizziness, paresthesias, racing thoughts, feelings of losing control, difficulty concentrating, suicidal ideation. Denies substance or alcohol abuse. Reported side effects from the treatment: none. Hyperlipidemia  Dr. Cristiane Garcia, her endocrinologist, recommended statin 9/2020, but lipids improved some 11/2020  Taking no meds. Is reluctant. No new myalgias, no joint pains, no weakness  No TIA's, no chest pain on exertion, no dyspnea on exertion, no swelling of ankles. Admits to poor diet laterly  Hs-CRP is 9.4 (high risk)  FH stroke in both parents. No FH coronary dz  Lab Results   Component Value Date/Time    Cholesterol, total 265 (H) 03/31/2021 11:21 AM    HDL Cholesterol 75 03/31/2021 11:21 AM    LDL, calculated 154.6 (H) 03/31/2021 11:21 AM    VLDL, calculated 35.4 03/31/2021 11:21 AM    Triglyceride 177 (H) 03/31/2021 11:21 AM    CHOL/HDL Ratio 3.5 03/31/2021 11:21 AM       Review of Systems   All other systems reviewed and are negative, except as noted in HPI    Past Medical and Surgical History   has a past medical history of ADHD (attention deficit hyperactivity disorder) (2008), Allergy to mold spores, Anxiety (3/2012), Asthma, Breast nodule (3/2014), Environmental allergies, Family history of skin cancer, Food allergy, Graves disease (2012), H/O mammogram (05/11/2017; 7/31/18; 8/05/19), mammogram (09/09/2020), [facility]AG[facility] (obstructive sleep apnea), Pap smear for cervical cancer screening (06/11/2019), and Vitamin D deficiency.  She also has no past medical history of Arsenic suspected exposure, Radiation exposure, Skin cancer, Sun-damaged skin, Sunburn, blistering, or Tanning bed exposure. has a past surgical history that includes hx knee arthroscopy (2003); hx wisdom teeth extraction; and hx endoscopy (2005?). reports that she has never smoked. She has never used smokeless tobacco. She reports current alcohol use. She reports that she does not use drugs. family history includes Alcohol abuse in her paternal grandfather; Alzheimer in her maternal grandmother and paternal grandmother; COPD in her maternal grandfather and mother; Cancer in her mother; Clotting Disorder in her maternal grandfather; Diabetes in her father and mother; Hypertension in her father, mother, and paternal grandfather; Obesity in her sister; Stroke in her father and mother; Thyroid Disease in her sister and sister. Physical Exam   Nursing note and vitals reviewed. Blood pressure 136/86, pulse 63, resp. rate 14, height 5' 5\" (1.651 m), weight 224 lb 6.4 oz (101.8 kg), last menstrual period 04/01/2021, SpO2 100 %. Constitutional:  No distress. Eyes: Conjunctivae are normal.   Ears:  Hearing grossly intact  Cardiovascular: Normal rate. regular rhythm, no murmurs or gallops  No edema  Pulmonary/Chest: Effort normal.   CTAB  Musculoskeletal: moves all 4 extremities   Neurological: Alert and oriented to person, place, and time. Skin: No visible rash noted. Psychiatric: Normal mood and affect. Behavior is normal.     ASSESSMENT and PLAN  Diagnoses and all orders for this visit:    1. Mixed hyperlipidemia  Chronic mixed hyperlipidemia with high HDL. She does have a significant hereditary component, but her levels have been significantly influenced by improved diet. She is going to work on diet again and have her lipids checked in a month when she sees her endocrinologist, Dr. Dana Daugherty. She says if levels remain high, she will start atorvastatin.   I think it would be helpful to do further risk assessment with a CT coronary. This will help strongly push lifelong statin use if she does have any atherosclerosis at this point. That said, her lifelong CV risk is stillhigh even if there is not any atherosclerosis or calcification at this point. Elevated CRP is associated with increased risk as well. -     CT HEART W/O CONT WITH CALCIUM; Future  -     atorvastatin (LIPITOR) 10 mg tablet; Take 1 Tab by mouth daily. 2. CRP elevated  -     CT HEART W/O CONT WITH CALCIUM; Future    3. Attention deficit hyperactivity disorder (ADHD), combined type  Reports she is doing relatively well with Adderall at current dose. Continue. She is aware she needs an office visit every 6 months for refills. 4. Anxiety  Borderline controlled. Stress regarding moving and caring for her parents. Discussed increasing Lexapro which she prefers to stay on 5 mg for now. Let me know and I can increase to 10 mg any time if needed. 5. Severe obesity with body mass index (BMI) of 35.0 to 39.9 with serious comorbidity ([facility] [address]  The patient is asked to make an attempt to improve diet and exercise patterns to aid in medical management of this problem. lab results and schedule of future lab studies reviewed with patient  reviewed medications and side effects in detail    Return to clinic for further evaluation if new symptoms develop        Current Outpatient Medications   Medication Sig    atorvastatin (LIPITOR) 10 mg tablet Take 1 Tab by mouth daily.  Adderall XR 10 mg XR capsule Take 1 Cap by mouth every morning. Max Daily Amount: 10 mg. BRAND MEDICALLY NECESSARY    escitalopram oxalate (LEXAPRO) 5 mg tablet TAKE 1 TABLET BY MOUTH EVERY DAY    aspirin 81 mg chewable tablet Take 81 mg by mouth Every Mon, Wed & Sun.    albuterol (ProAir HFA) 90 mcg/actuation inhaler Take 1 Puff by inhalation every six (6) hours as needed for Wheezing or Shortness of Breath.     ethinyl estradiol-etonogestrel (NUVARING) 0.12-0.015 mg/24 hr vaginal ring Per month: Insert vaginally, leave in for three weeks, remove for one week    montelukast (SINGULAIR) 10 mg tablet TAKE 1 TABLET BY MOUTH EVERY DAY    fluticasone furoate-vilanteroL (Breo Ellipta) 200-25 mcg/dose inhaler Take 1 Puff by inhalation daily.  ibuprofen (MOTRIN) 200 mg tablet Take 3 Tabs by mouth every eight (8) hours as needed for Pain.  diphenhydrAMINE (BENADRYL) 25 mg capsule Take 25 mg by mouth every six (6) hours as needed.  cholecalciferol, VITAMIN D3, (VITAMIN D3) 5,000 unit tab tablet Take 1 Tab by mouth daily.  cetirizine (ZYRTEC) 10 mg tablet Take 10 mg by mouth daily.  methimazole (TAPAZOLE) 5 mg tablet Take 1 Tab by mouth daily. (Patient taking differently: Take 2.5 mg by mouth daily. Daily)     No current facility-administered medications for this visit.

## 2021-04-06 ENCOUNTER — HOSPITAL ENCOUNTER (OUTPATIENT)
Dept: CT IMAGING | Age: 46
Discharge: HOME | End: 2021-04-06
Attending: INTERNAL MEDICINE

## 2021-04-06 DIAGNOSIS — E78.2 MIXED HYPERLIPIDEMIA: ICD-10-CM

## 2021-04-06 DIAGNOSIS — R79.82 CRP ELEVATED: ICD-10-CM

## 2021-04-06 PROCEDURE — 75571 CT HRT W/O DYE W/CA TEST: CPT

## 2021-04-10 PROBLEM — R93.1 AGATSTON CORONARY ARTERY CALCIUM SCORE LESS THAN 100: Status: ACTIVE | Noted: 2021-04-01

## 2021-04-27 RX ORDER — MONTELUKAST SODIUM 10 MG/1
TABLET ORAL
Qty: 90 TAB | Refills: 3 | Status: SHIPPED | OUTPATIENT
Start: 2021-04-27 | End: 2022-05-06

## 2021-05-29 DIAGNOSIS — F90.2 ATTENTION DEFICIT HYPERACTIVITY DISORDER (ADHD), COMBINED TYPE: ICD-10-CM

## 2021-05-30 RX ORDER — DEXTROAMPHETAMINE SULFATE, DEXTROAMPHETAMINE SACCHARATE, AMPHETAMINE SULFATE AND AMPHETAMINE ASPARTATE 2.5; 2.5; 2.5; 2.5 MG/1; MG/1; MG/1; MG/1
10 CAPSULE, EXTENDED RELEASE ORAL
Qty: 90 CAPSULE | Refills: 0 | Status: SHIPPED | OUTPATIENT
Start: 2021-05-30 | End: 2021-08-30 | Stop reason: SDUPTHER

## 2021-08-30 DIAGNOSIS — F90.2 ATTENTION DEFICIT HYPERACTIVITY DISORDER (ADHD), COMBINED TYPE: ICD-10-CM

## 2021-08-30 DIAGNOSIS — F41.9 ANXIETY: ICD-10-CM

## 2021-08-30 RX ORDER — ESCITALOPRAM OXALATE 5 MG/1
5 TABLET ORAL DAILY
Qty: 90 TABLET | Refills: 1 | Status: SHIPPED | OUTPATIENT
Start: 2021-08-30 | End: 2021-11-22 | Stop reason: SDUPTHER

## 2021-08-30 RX ORDER — DEXTROAMPHETAMINE SULFATE, DEXTROAMPHETAMINE SACCHARATE, AMPHETAMINE SULFATE AND AMPHETAMINE ASPARTATE 2.5; 2.5; 2.5; 2.5 MG/1; MG/1; MG/1; MG/1
10 CAPSULE, EXTENDED RELEASE ORAL
Qty: 90 CAPSULE | Refills: 0 | Status: SHIPPED | OUTPATIENT
Start: 2021-08-30 | End: 2021-09-07 | Stop reason: SDUPTHER

## 2021-08-31 RX ORDER — ETONOGESTREL AND ETHINYL ESTRADIOL 11.7; 2.7 MG/1; MG/1
INSERT, EXTENDED RELEASE VAGINAL
Qty: 3 RING | Refills: 0 | Status: CANCELLED | OUTPATIENT
Start: 2021-08-31

## 2021-08-31 NOTE — TELEPHONE ENCOUNTER
Patient just moved to [place]. AE/Mammo scheduled for 10/11 . Insurance requires 90 day supply will not dispense 1 month of medication.  Rx pended for 3 month supply to preferred [place] pharmacy

## 2021-09-03 ENCOUNTER — TELEPHONE (OUTPATIENT)
Dept: INTERNAL MEDICINE CLINIC | Age: 46
End: 2021-09-03

## 2021-09-03 NOTE — TELEPHONE ENCOUNTER
Please confirm with patient of this this and confirm it ok to change her med. Simplest change is to generic amphetamine-dex ER.

## 2021-09-03 NOTE — TELEPHONE ENCOUNTER
Fax received from patient's [facility]: Adderall XR 10mg ER Capsules is not covered by Mrs. Fernandez's insurance. Alternative covered options include: Amphetamine-Dextroamphetamine ER, Amphetamine-Dextroamphetamine, Dextroamphetamine Sulfate ER, Amphetamine ER, Mydayis. Please send in a new prescription for one of the covered alternatives as attempting a pre-auth is not an option. Message forwarded to PCP for review & next steps.

## 2021-09-07 ENCOUNTER — PATIENT MESSAGE (OUTPATIENT)
Dept: INTERNAL MEDICINE CLINIC | Age: 46
End: 2021-09-07

## 2021-09-07 ENCOUNTER — TELEPHONE (OUTPATIENT)
Dept: INTERNAL MEDICINE CLINIC | Age: 46
End: 2021-09-07

## 2021-09-07 DIAGNOSIS — Z92.0: ICD-10-CM

## 2021-09-07 DIAGNOSIS — F90.2 ATTENTION DEFICIT HYPERACTIVITY DISORDER (ADHD), COMBINED TYPE: ICD-10-CM

## 2021-09-07 RX ORDER — ETONOGESTREL AND ETHINYL ESTRADIOL 11.7; 2.7 MG/1; MG/1
INSERT, EXTENDED RELEASE VAGINAL
Qty: 3 RING | Refills: 0 | Status: SHIPPED | OUTPATIENT
Start: 2021-09-07 | End: 2021-11-08

## 2021-09-07 RX ORDER — DEXTROAMPHETAMINE SACCHARATE, AMPHETAMINE ASPARTATE MONOHYDRATE, DEXTROAMPHETAMINE SULFATE AND AMPHETAMINE SULFATE 2.5; 2.5; 2.5; 2.5 MG/1; MG/1; MG/1; MG/1
10 CAPSULE, EXTENDED RELEASE ORAL
Qty: 90 CAPSULE | Refills: 0 | Status: SHIPPED | OUTPATIENT
Start: 2021-09-07 | End: 2021-09-08 | Stop reason: ALTCHOICE

## 2021-09-07 RX ORDER — DEXTROAMPHETAMINE SACCHARATE, AMPHETAMINE ASPARTATE MONOHYDRATE, DEXTROAMPHETAMINE SULFATE AND AMPHETAMINE SULFATE 2.5; 2.5; 2.5; 2.5 MG/1; MG/1; MG/1; MG/1
10 CAPSULE, EXTENDED RELEASE ORAL
Refills: 0 | Status: CANCELLED | OUTPATIENT
Start: 2021-09-07

## 2021-09-07 NOTE — TELEPHONE ENCOUNTER
Incoming call from patient verbalizing agreement for the generic Amphetamine-Dextroamphetamine ER to be sent in by PCP. Message forwarded to PCP requesting medication change to the generic. Patient is hopeful that this can be sent in as soon as possible as she requested the refill last week & she has been getting the run-around with her insurance company about pre-auths being needed for the brand & then being told that the brand is not covered, so now the generic is required. Patient asked for nurse to mail her a copy of the letter from [employer] [facility] stating that the brand is not covered.

## 2021-09-08 ENCOUNTER — PATIENT MESSAGE (OUTPATIENT)
Dept: INTERNAL MEDICINE CLINIC | Age: 46
End: 2021-09-08

## 2021-09-08 ENCOUNTER — TELEPHONE (OUTPATIENT)
Dept: INTERNAL MEDICINE CLINIC | Age: 46
End: 2021-09-08

## 2021-09-08 DIAGNOSIS — F90.2 ATTENTION DEFICIT HYPERACTIVITY DISORDER (ADHD), COMBINED TYPE: ICD-10-CM

## 2021-09-08 RX ORDER — DEXTROAMPHETAMINE SULFATE, DEXTROAMPHETAMINE SACCHARATE, AMPHETAMINE SULFATE AND AMPHETAMINE ASPARTATE 2.5; 2.5; 2.5; 2.5 MG/1; MG/1; MG/1; MG/1
10 CAPSULE, EXTENDED RELEASE ORAL
Qty: 90 CAPSULE | Refills: 0 | Status: SHIPPED | OUTPATIENT
Start: 2021-09-08 | End: 2021-11-22 | Stop reason: SDUPTHER

## 2021-09-08 NOTE — TELEPHONE ENCOUNTER
Per patient's request to PCP, nurse submitted necessary clinical information for patient's brand name Adderall prescription as patient is requesting the brand only & per patient the brand does require a pre-authorization. After review of submitted clinicals, [facility] has approved the pre-authorization for patient's brand name Adderall with an approval time period of 9/8/2021 through 9/7/2024. [id number], [place]: [phone], Rx: [id number]. Livelyt message sent to patient with the aforementioned information & asking if patient has already picked up the generic prescription from the pharmacy; if not, generic script can be cancelled & replaced with a new prescription for the brand name. PCP has been notified of update & [facility] approval letter will be sent to scanning.

## 2021-10-11 ENCOUNTER — OFFICE VISIT (OUTPATIENT)
Dept: INTERNAL MEDICINE CLINIC | Age: 46
End: 2021-10-11

## 2021-10-11 ENCOUNTER — OFFICE VISIT (OUTPATIENT)
Dept: OBGYN CLINIC | Age: 46
End: 2021-10-11

## 2021-10-11 VITALS
DIASTOLIC BLOOD PRESSURE: 74 MMHG | RESPIRATION RATE: 15 BRPM | BODY MASS INDEX: 35.16 KG/M2 | SYSTOLIC BLOOD PRESSURE: 108 MMHG | HEIGHT: 65 IN | TEMPERATURE: 98.4 F | HEART RATE: 70 BPM | WEIGHT: 211 LBS | OXYGEN SATURATION: 98 %

## 2021-10-11 VITALS
HEART RATE: 94 BPM | BODY MASS INDEX: 36.21 KG/M2 | SYSTOLIC BLOOD PRESSURE: 136 MMHG | WEIGHT: 217.6 LBS | DIASTOLIC BLOOD PRESSURE: 82 MMHG

## 2021-10-11 DIAGNOSIS — Z12.4 CERVICAL CANCER SCREENING: ICD-10-CM

## 2021-10-11 DIAGNOSIS — Z01.419 ENCOUNTER FOR WELL WOMAN EXAM WITH ROUTINE GYNECOLOGICAL EXAM: Primary | ICD-10-CM

## 2021-10-11 DIAGNOSIS — E05.90 HYPERTHYROIDISM: ICD-10-CM

## 2021-10-11 DIAGNOSIS — E78.2 MIXED HYPERLIPIDEMIA: ICD-10-CM

## 2021-10-11 DIAGNOSIS — F90.2 ATTENTION DEFICIT HYPERACTIVITY DISORDER (ADHD), COMBINED TYPE: Primary | ICD-10-CM

## 2021-10-11 DIAGNOSIS — J45.20 MILD INTERMITTENT ASTHMA WITHOUT COMPLICATION: ICD-10-CM

## 2021-10-11 DIAGNOSIS — Z23 NEEDS FLU SHOT: ICD-10-CM

## 2021-10-11 PROCEDURE — 99396 PREV VISIT EST AGE 40-64: CPT | Performed by: OBSTETRICS & GYNECOLOGY

## 2021-10-11 PROCEDURE — 99213 OFFICE O/P EST LOW 20 MIN: CPT | Performed by: INTERNAL MEDICINE

## 2021-10-11 PROCEDURE — 90686 IIV4 VACC NO PRSV 0.5 ML IM: CPT | Performed by: INTERNAL MEDICINE

## 2021-10-11 PROCEDURE — 90471 IMMUNIZATION ADMIN: CPT | Performed by: INTERNAL MEDICINE

## 2021-10-11 RX ORDER — METHIMAZOLE 5 MG/1
TABLET ORAL
Qty: 90 TABLET | Refills: 0
Start: 2021-10-11

## 2021-10-11 NOTE — PROGRESS NOTES
HISTORY OF PRESENT ILLNESS    Chief Complaint   Patient presents with    Medication Refill       Presents for follow-up    Moving back to [place] this month. She likely will not return to care here. Father Parkinson's, mother w hx stroke. Patient is seen for anxiety disorder and ADD. Current treatment includes Lexapro  5 mg and Adderall XR 10 mg and seeing a therapist.  Ongoing symptoms include: anxiety about moving  Patient denies: sweating, chest pain, dizziness, paresthesias, racing thoughts, feelings of losing control, difficulty concentrating, suicidal ideation. Denies substance or alcohol abuse. Reported side effects from the treatment: none. Hyperlipidemia  Dr. Johnie To, her endocrinologist, recommended statin 9/2020, but lipids improved some 11/2020  Taking no meds. Is reluctant. Coronary calcium score zero 4/2021  No new myalgias, no joint pains, no weakness  No TIA's, no chest pain on exertion, no dyspnea on exertion, no swelling of ankles. Hs-CRP is 9.4 (high risk)  FH stroke in both parents.   No FH coronary dz  Lab Results   Component Value Date/Time    Cholesterol, total 265 (H) 03/31/2021 11:21 AM    HDL Cholesterol 75 03/31/2021 11:21 AM    LDL, calculated 154.6 (H) 03/31/2021 11:21 AM    VLDL, calculated 35.4 03/31/2021 11:21 AM    Triglyceride 177 (H) 03/31/2021 11:21 AM    CHOL/HDL Ratio 3.5 03/31/2021 11:21 AM       Review of Systems   All other systems reviewed and are negative, except as noted in HPI    Past Medical and Surgical History   has a past medical history of ADHD (attention deficit hyperactivity disorder) (2008), Agatston coronary artery calcium score less than 100 (04/2021), Allergy to mold spores, Anxiety (3/2012), Asthma, Breast nodule (3/2014), Environmental allergies, Family history of skin cancer, Food allergy, Graves disease (2012), H/O mammogram (05/11/2017; 7/31/18; 8/05/19), mammogram (09/09/2020), WAGNER (obstructive sleep apnea), Pap smear for cervical cancer screening (06/11/2019), and Vitamin D deficiency. She also has no past medical history of Arsenic suspected exposure, Radiation exposure, Skin cancer, Sun-damaged skin, Sunburn, blistering, or Tanning bed exposure. has a past surgical history that includes hx knee arthroscopy (2003); hx wisdom teeth extraction; and hx endoscopy (2005?). reports that she has never smoked. She has never used smokeless tobacco. She reports current alcohol use. She reports that she does not use drugs. family history includes Alcohol abuse in her paternal grandfather; Alzheimer in her maternal grandmother and paternal grandmother; COPD in her maternal grandfather and mother; Cancer in her mother; Clotting Disorder in her maternal grandfather; Diabetes in her father and mother; Hypertension in her father, mother, and paternal grandfather; Obesity in her sister; Stroke in her father and mother; Thyroid Disease in her sister and sister. Physical Exam   Nursing note and vitals reviewed. Blood pressure 108/74, pulse 70, temperature 98.4 °F (36.9 °C), temperature source Oral, resp. rate 15, height 5' 5\" (1.651 m), weight 211 lb (95.7 kg), last menstrual period 08/27/2021, SpO2 98 %. Constitutional:  No distress. Eyes: Conjunctivae are normal.   Ears:  Hearing grossly intact  Cardiovascular: Normal rate. regular rhythm, no murmurs or gallops  No edema  Pulmonary/Chest: Effort normal.   CTAB  Musculoskeletal: moves all 4 extremities   Neurological: Alert and oriented to person, place, and time. Skin: No visible rash noted. Psychiatric: Normal mood and affect. Behavior is normal.     Diagnoses and all orders for this visit:    1. Attention deficit hyperactivity disorder (ADHD), combined type  This condition is controlled on current medication regimen as written in medication list.  Medications refilled.  profile was accessed online for [facility] and reviewed by me during this encounter.   I did not see evidence of inappropriate or suspicious controlled substance prescription activity. She will look for a new doctor in [place]. I am happy to refill this medication for 6 months, but then will need an appointment if she wants to continue it. 2. Mixed hyperlipidemia  The patient is asked to make an attempt to improve diet and exercise patterns to aid in medical management of this problem. Her lipids have been highly influenced by healthy diet which I have strongly recommend that she continue. She does have a family history of coronary disease and an elevated CRP, both increasing her lifelong risk of cardiovascular disease. That said, her coronary calcium score was normal this year and she is reluctant to take medications. Discussed how long-term management of this condition likely should involve a statin at some point, but as there is no current evidence of coronary disease on her scan, it is reasonable to defer that for now, but I do recommend yearly lipid checks and perhaps check another coronary calcium score in 2 to 3 years. 3. Hyperthyroidism  Managed by her endocrinologist, Dr. Cathi Mcardle. Continue current doses. -     methIMAzole (TAPAZOLE) 5 mg tablet; Take 1/2 pill Mon Wed Fri and take 1 tab Tues Thurs Sat Sun  Indications: overactive thyroid gland    4. Mild intermittent asthma without complication  Currently asymptomatic. Continue Breo, albuterol as needed and continue Singulair daily.     5. Needs flu shot  -     INFLUENZA VIRUS VAC QUAD,SPLIT,PRESV FREE SYRINGE IM    lab results and schedule of future lab studies reviewed with patient  reviewed medications and side effects in detail    Return to clinic for further evaluation if new symptoms develop        Current Outpatient Medications   Medication Sig    methIMAzole (TAPAZOLE) 5 mg tablet Take 1/2 pill Mon Wed Fri and take 1 tab Tues Thurs Sat Sun  Indications: overactive thyroid gland    Adderall XR 10 mg XR capsule Take 1 Capsule by mouth every morning. Max Daily Amount: 10 mg. BRAND NAME APPROVED BY INSURANCE 9/8/21    ethinyl estradiol-etonogestrel (NUVARING) 0.12-0.015 mg/24 hr vaginal ring Per month: Insert vaginally, leave in for three weeks, remove for one week    escitalopram oxalate (LEXAPRO) 5 mg tablet Take 1 Tablet by mouth daily.  Breo Ellipta 200-25 mcg/dose inhaler TAKE 1 PUFF BY MOUTH EVERY DAY    montelukast (SINGULAIR) 10 mg tablet TAKE 1 TABLET BY MOUTH EVERY DAY    aspirin 81 mg chewable tablet Take 81 mg by mouth Every Mon, Wed & Sun.    albuterol (ProAir HFA) 90 mcg/actuation inhaler Take 1 Puff by inhalation every six (6) hours as needed for Wheezing or Shortness of Breath.  ibuprofen (MOTRIN) 200 mg tablet Take 3 Tabs by mouth every eight (8) hours as needed for Pain.  diphenhydrAMINE (BENADRYL) 25 mg capsule Take 25 mg by mouth every six (6) hours as needed.  cholecalciferol, VITAMIN D3, (VITAMIN D3) 5,000 unit tab tablet Take 1 Tab by mouth daily.  cetirizine (ZYRTEC) 10 mg tablet Take 10 mg by mouth daily. No current facility-administered medications for this visit.

## 2021-10-11 NOTE — PROGRESS NOTES
[address] [facility]  [email or web address]  [phone]    Luz Maria Dietz MD, [address] [facility]       Annual Gynecologic Exam  [facility] 40-60  Chief Complaint   Patient presents with    Well Woman       Syl Land Rash is a 40 y.o. No obstetric history on file. WHITE/NON-  female who presents for an annual exam.  Patient's last menstrual period was 08/27/2021 (exact date). .    Patient has the following concerns today: Pt reports that her pelvic area feels \"exhausted\" during her periods. She is not sure if she is entering perimenopause which she talks about. She also reports skipping a period with the nuva ring. Menstrual status:  She does not report dysmenorrhea/painful menses. She does not report heavy menses. She does not report irregular bleeding. Sexual history and Contraception:  Social History     Substance and Sexual Activity   Sexual Activity Yes    Partners: Male    Birth control/protection: Inserts       She does not reports new sexual partner(s) in the last year. Preventive Medicine History:  Her most recent Pap smear result: normal was obtained in June 2019    Her most recent HR HPV screen was Negative obtained in 2019    She does not have a history of KENZIE 2, 3 or cervical cancer. Breast health:  [facility] Results (most recent):  Results from [place] encounter on 10/11/21    [facility] 3D JAE W MAMMO BI SCREENING INCL CAD    Narrative  STUDY: Bilateral digital screening mammogram with 3-D tomosynthesis    INDICATION:  Screening. COMPARISON: Priors dating back to 2017    BREAST COMPOSITION: There are scattered areas of fibroglandular density. FINDINGS: Bilateral digital screening mammography was performed and is  interpreted in conjunction with a computer assisted detection (CAD) system. Additionally, tomosynthesis of both breasts in the CC and MLO projections was  performed. No suspicious masses or calcifications are identified.  There has been  no significant change. Impression  BI-RADS 1: Negative. No mammographic evidence of malignancy. RECOMMENDATIONS:  Next screening mammogram is recommended in one year. The patient will be notified of these results. Last mammogram: was normal.   Breast cancer family updated: see FH. Past Medical History:   Diagnosis Date    ADHD (attention deficit hyperactivity disorder)     adult.  Agatston coronary artery calcium score less than 100 2021    score of zero    Allergy to mold spores     Anxiety 3/2012    first rx age 33 5    Asthma     mild.  Breast nodule 3/2014    R 12:00.  dense tissue. Dr. Jiang Blocker Environmental allergies     hx shots as a child    Family history of skin cancer     maternal aunt and uncle    Food allergy     garlic (test positive), eggs (but tested neg)    Graves disease     with toxic MNG. Dr. Bella Connelly H/O mammogram 2017; 19; 20    Negative; Negative; negative    Hx of mammogram 2020    Benign, BiRads-2 fibroglandular densities     WAGNER (obstructive sleep apnea)     mild. Dr. Claire Valladares. dental appliance.  Pap smear for cervical cancer screening 2019    Neg/Neg HPV    Vitamin D deficiency      OB History    Para Term  AB Living                 SAB TAB Ectopic Molar Multiple Live Births             0   Obstetric Comments   Menarche:  16. LMP: 15. # of Children:  0. Age at Delivery of First Child:  n/a. Hysterectomy/oophorectomy:  NO/NO. Breast Bx:  no.  Hx of Breast Feeding:  no. BCP:  yes. Hormone therapy:  no.       Past Surgical History:   Procedure Laterality Date    HX ENDOSCOPY  ?     HX KNEE ARTHROSCOPY      right    HX WISDOM TEETH EXTRACTION       Family History   Problem Relation Age of Onset    Cancer Mother         cervical    Diabetes Mother     COPD Mother    Minerva Kale Stroke Mother     Hypertension Mother     Diabetes Father     Hypertension Father     Stroke Father     Thyroid Disease Sister     Obesity Sister     Thyroid Disease Sister     Alzheimer Maternal Grandmother     COPD Maternal Grandfather     Clotting Disorder Maternal Grandfather         multiple DVTs    Alzheimer Paternal Grandmother     Alcohol abuse Paternal Grandfather     Hypertension Paternal Grandfather      Social History     Socioeconomic History    Marital status:      Spouse name: Not on file    Number of children: 0    Years of education: Not on file    Highest education level: Not on file   Occupational History    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 Employer: [employer]   Tobacco Use    Smoking status: Never Smoker    Smokeless tobacco: Never Used   Vaping Use    Vaping Use: Never used   Substance and Sexual Activity    Alcohol use: Yes     Comment: 3-4 per month    Drug use: No    Sexual activity: Yes     Partners: Male     Birth control/protection: Inserts   Other Topics Concern    Not on file   Social History Narrative    Not on file     Social Determinants of Health     Financial Resource Strain:     Difficulty of Paying Living Expenses:    Food Insecurity:     Worried About Running Out of Food in the Last Year:     Ran Out of Food in the Last Year:    Transportation Needs:     Lack of Transportation (Medical):  Lack of Transportation (Non-Medical):    Physical Activity:     Days of Exercise per Week:     Minutes of Exercise per Session:    Stress:     Feeling of Stress :    Social Connections:     Frequency of Communication with Friends and Family:     Frequency of Social Gatherings with Friends and Family:     Attends Bahai Services:     Active Member of Clubs or Organizations:     Attends Club or Organization Meetings:     Marital Status:    Intimate Partner Violence:     Fear of Current or Ex-Partner:     Emotionally Abused:     Physically Abused:     Sexually Abused:         Allergies   Allergen Reactions    Azithromycin Diarrhea     bloated  bloated    Clotrimazole-Betamethasone Other (comments)     Atrophy of the skin    Wellbutrin [Bupropion Hcl] Other (comments)     irritability       Current Outpatient Medications   Medication Sig    methIMAzole (TAPAZOLE) 5 mg tablet Take 1/2 pill Mon Wed Fri and take 1 tab Tues Thurs Sat Sun  Indications: overactive thyroid gland    Adderall XR 10 mg XR capsule Take 1 Capsule by mouth every morning. Max Daily Amount: 10 mg. BRAND NAME APPROVED BY INSURANCE 9/8/21    ethinyl estradiol-etonogestrel (NUVARING) 0.12-0.015 mg/24 hr vaginal ring Per month: Insert vaginally, leave in for three weeks, remove for one week    escitalopram oxalate (LEXAPRO) 5 mg tablet Take 1 Tablet by mouth daily.  montelukast (SINGULAIR) 10 mg tablet TAKE 1 TABLET BY MOUTH EVERY DAY    aspirin 81 mg chewable tablet Take 81 mg by mouth Every Mon, Wed & Sun.    cholecalciferol, VITAMIN D3, (VITAMIN D3) 5,000 unit tab tablet Take 1 Tab by mouth daily.  cetirizine (ZYRTEC) 10 mg tablet Take 10 mg by mouth daily.  Breo Ellipta 200-25 mcg/dose inhaler TAKE 1 PUFF BY MOUTH EVERY DAY    albuterol (ProAir HFA) 90 mcg/actuation inhaler Take 1 Puff by inhalation every six (6) hours as needed for Wheezing or Shortness of Breath.  ibuprofen (MOTRIN) 200 mg tablet Take 3 Tabs by mouth every eight (8) hours as needed for Pain.  diphenhydrAMINE (BENADRYL) 25 mg capsule Take 25 mg by mouth every six (6) hours as needed. No current facility-administered medications for this visit. Patient Active Problem List   Diagnosis Code    Anxiety F41.9    Vitamin D deficiency E55.9    Hyperthyroidism E05.90    Environmental allergies Z91.09    Asthma J45.909    Headache(784.0) R51    Family history of ovarian cancer Z80.41    Breast lump on right side at 1 o'clock position N63.12    Large breasts N62    Severe obesity (BMI 35.0-39. 9) E66.01    Attention deficit hyperactivity disorder (ADHD), combined type F90.2    Food allergy Z91.018    Agatston coronary artery calcium score less than 100 R93.1       Review of Systems - History obtained from the patient  Constitutional/general, HEENT, CV, Resp, GI, MSK, Neuro, Psych, Heme/lymph, Skin, Breast ROS: no significant complaints except as noted on HPI     Physical Exam  Visit Vitals  /82 (BP 1 Location: Left arm, BP Patient Position: Sitting, BP Cuff Size: Adult)   Pulse 94   Wt 217 lb 9.6 oz (98.7 kg)   LMP 08/27/2021 (Exact Date)   BMI 36.21 kg/m²       Constitutional  · Appearance: well-nourished, well developed, alert, in no acute distress    HENT  · Head and Face: appears normal    Neck  · Inspection/Palpation: normal appearance, no masses or tenderness  · Lymph Nodes: no lymphadenopathy present  · Thyroid: gland size normal, nontender, no nodules or masses present on palpation    Chest  · Respiratory Effort: breathing unlabored  · Auscultation: normal breath sounds    Cardiovascular  · Heart:  · Auscultation: regular rate and rhythm without murmur    Breasts  · Inspection of Breasts: breasts symmetrical, no skin changes, no discharge present, nipple appearance normal, no skin retraction present  · Palpation of Breasts and Axillae: no masses present on palpation, no breast tenderness  · Axillary Lymph Nodes: no lymphadenopathy present    Gastrointestinal  · Abdominal Examination: abdomen non-tender to palpation, normal bowel sounds, no masses present  · Liver and spleen: no hepatomegaly present, spleen not palpable  · Hernias: no hernias identified    Genitourinary  · External Genitalia: normal appearance for age, no discharge present, no tenderness present, no inflammatory lesions present, no masses present, without atrophy present  · Vagina: normal vaginal vault without central or paravaginal defects, no discharge present, no inflammatory lesions present, no masses present  · Bladder: non-tender to palpation  · Urethra: appears normal  · Cervix: normal   · Uterus: normal size, shape and consistency  · Adnexa: no adnexal tenderness present, no adnexal masses present  · Perineum: perineum within normal limits, no evidence of trauma, no rashes or skin lesions present  · Anus: anus within normal limits, no hemorrhoids present  · Inguinal Lymph Nodes: no lymphadenopathy present    Skin  · General Inspection: no rash, no lesions identified    Neurologic/Psychiatric  · Mental Status:  · Orientation: grossly oriented to person, place and time  · Mood and Affect: mood normal, affect appropriate    Assessment:  40 y.o. No obstetric history on file. for well woman exam  Encounter Diagnoses   Name Primary?  Encounter for well woman exam with routine gynecological exam Yes    Cervical cancer screening        Plan:  The patient was counseled about healthy lifestyle, disease prevention, and bone protection. We discussed current self breast exam and mammogram recommendations  We discussed current pap smear and HR HPV testing guidelines  I recommended follow up in one year for routine annual gynecologic exam or sooner if needed  I recommended follow up with a primary care physician for chronic medical problems and evaluation of non-gynecologic concerns and to please contact our office with any GYN questions or concerns. Discussed risks, benefits and alternatives of OCP/nuvaring/patch: including but not limited to dvt/pe/mi/cva/ca/gi risks and that smoking, increasing age and other health conditions can increase these risks.           Folllow up:  [x] return for annual well woman exam in one year or sooner if she is having problems  [] follow up and ultrasound  [x] mammogram  [] 6 months  [] 3 months  [] 1 month    Orders Placed This Encounter    PAP IG, APTIMA HPV AND RFX 86/30,40 ([id number])       Results for orders placed or performed during the hospital encounter of 10/11/21   [facility] 3D JAE W MAMMO BI SCREENING INCL CAD    Narrative    STUDY: Bilateral digital screening mammogram with 3-D tomosynthesis    INDICATION:  Screening. COMPARISON: Priors dating back to 2017    BREAST COMPOSITION: There are scattered areas of fibroglandular density. FINDINGS: Bilateral digital screening mammography was performed and is  interpreted in conjunction with a computer assisted detection (CAD) system. Additionally, tomosynthesis of both breasts in the CC and MLO projections was  performed. No suspicious masses or calcifications are identified. There has been  no significant change. Impression    BI-RADS 1: Negative. No mammographic evidence of malignancy. RECOMMENDATIONS:  Next screening mammogram is recommended in one year. The patient will be notified of these results.

## 2021-10-11 NOTE — PATIENT INSTRUCTIONS
Well Visit, Ages 25 to 48: Care Instructions  Overview     Well visits can help you stay healthy. Your doctor has checked your overall health and may have suggested ways to take good care of yourself. Your doctor also may have recommended tests. At home, you can help prevent illness with healthy eating, regular exercise, and other steps. Follow-up care is a key part of your treatment and safety. Be sure to make and go to all appointments, and call your doctor if you are having problems. It's also a good idea to know your test results and keep a list of the medicines you take. How can you care for yourself at home? · Get screening tests that you and your doctor decide on. Screening helps find diseases before any symptoms appear. · Eat healthy foods. Choose fruits, vegetables, whole grains, protein, and low-fat dairy foods. Limit fat, especially saturated fat. Reduce salt in your diet. · Limit alcohol. If you are a man, have no more than 2 drinks a day or 14 drinks a week. If you are a woman, have no more than 1 drink a day or 7 drinks a week. · Get at least 30 minutes of physical activity on most days of the week. Walking is a good choice. You also may want to do other activities, such as running, swimming, cycling, or playing tennis or team sports. Discuss any changes in your exercise program with your doctor. · Reach and stay at a healthy weight. This will lower your risk for many problems, such as obesity, diabetes, heart disease, and high blood pressure. · Do not smoke or allow others to smoke around you. If you need help quitting, talk to your doctor about stop-smoking programs and medicines. These can increase your chances of quitting for good. · Care for your mental health. It is easy to get weighed down by worry and stress. Learn strategies to manage stress, like deep breathing and mindfulness, and stay connected with your family and community.  If you find you often feel sad or hopeless, talk with your doctor. Treatment can help. · Talk to your doctor about whether you have any risk factors for sexually transmitted infections (STIs). You can help prevent STIs if you wait to have sex with a new partner (or partners) until you've each been tested for STIs. It also helps if you use condoms (male or female condoms) and if you limit your sex partners to one person who only has sex with you. Vaccines are available for some STIs, such as HPV. · Use birth control if it's important to you to prevent pregnancy. Talk with your doctor about the choices available and what might be best for you. · If you think you may have a problem with alcohol or drug use, talk to your doctor. This includes prescription medicines (such as amphetamines and opioids) and illegal drugs (such as cocaine and methamphetamine). Your doctor can help you figure out what type of treatment is best for you. · Protect your skin from too much sun. When you're outdoors from 10 a.m. to 4 p.m., stay in the shade or cover up with clothing and a hat with a wide brim. Wear sunglasses that block UV rays. Even when it's cloudy, put broad-spectrum sunscreen (SPF 30 or higher) on any exposed skin. · See a dentist one or two times a year for checkups and to have your teeth cleaned. · Wear a seat belt in the car. When should you call for help? Watch closely for changes in your health, and be sure to contact your doctor if you have any problems or symptoms that concern you. Where can you learn more? Go to http://[email or web address].com/  Enter P072 in the search box to learn more about \"Well Visit, Ages 25 to 48: Care Instructions. \"  Current as of: February 11, 2021               Content Version: 13.0  © 4661-0917 [employer], [employer]. Care instructions adapted under license by [employer] (which disclaims liability or warranty for this information).  If you have questions about a medical condition or this instruction, always ask your healthcare professional. [place] [address] any warranty or liability for your use of this information.

## 2021-10-16 LAB
CYTOLOGIST CVX/VAG CYTO: NORMAL
CYTOLOGY CVX/VAG DOC CYTO: NORMAL
CYTOLOGY CVX/VAG DOC THIN PREP: NORMAL
CYTOLOGY HISTORY:: NORMAL
DX ICD CODE: NORMAL
HPV I/H RISK 4 DNA CVX QL PROBE+SIG AMP: NEGATIVE
Lab: NORMAL
OTHER STN SPEC: NORMAL
STAT OF ADQ CVX/VAG CYTO-IMP: NORMAL

## 2021-10-16 NOTE — PROGRESS NOTES
Normal pap smear, message sent if [facility] active. Update PMH/: include: Date of pap, Cytology: wnl. For HR HPV results: list NEG or POS, when done.

## 2021-11-08 ENCOUNTER — TELEPHONE (OUTPATIENT)
Dept: OBGYN CLINIC | Age: 46
End: 2021-11-08

## 2021-11-08 DIAGNOSIS — Z92.0: ICD-10-CM

## 2021-11-08 RX ORDER — ETONOGESTREL AND ETHINYL ESTRADIOL 11.7; 2.7 MG/1; MG/1
INSERT, EXTENDED RELEASE VAGINAL
Qty: 3 RING | Refills: 3 | Status: SHIPPED | OUTPATIENT
Start: 2021-11-08

## 2021-11-08 NOTE — TELEPHONE ENCOUNTER
40year old patient last seen in the office on 10/11/2021 for annual exam      ? India Damon to refill the requested prescription to get patient to her scheduled appointment    Please advise    Thank you

## 2021-11-22 DIAGNOSIS — F41.9 ANXIETY: ICD-10-CM

## 2021-11-22 DIAGNOSIS — F90.2 ATTENTION DEFICIT HYPERACTIVITY DISORDER (ADHD), COMBINED TYPE: ICD-10-CM

## 2021-11-22 RX ORDER — ESCITALOPRAM OXALATE 5 MG/1
5 TABLET ORAL DAILY
Qty: 90 TABLET | Refills: 1 | Status: SHIPPED | OUTPATIENT
Start: 2021-11-22 | End: 2022-02-08

## 2021-11-22 RX ORDER — DEXTROAMPHETAMINE SULFATE, DEXTROAMPHETAMINE SACCHARATE, AMPHETAMINE SULFATE AND AMPHETAMINE ASPARTATE 2.5; 2.5; 2.5; 2.5 MG/1; MG/1; MG/1; MG/1
10 CAPSULE, EXTENDED RELEASE ORAL
Qty: 90 CAPSULE | Refills: 0 | Status: SHIPPED | OUTPATIENT
Start: 2021-11-22 | End: 2021-11-23 | Stop reason: SDUPTHER

## 2021-11-22 RX ORDER — DEXTROAMPHETAMINE SULFATE, DEXTROAMPHETAMINE SACCHARATE, AMPHETAMINE SULFATE AND AMPHETAMINE ASPARTATE 2.5; 2.5; 2.5; 2.5 MG/1; MG/1; MG/1; MG/1
10 CAPSULE, EXTENDED RELEASE ORAL
Qty: 90 CAPSULE | Refills: 0 | Status: SHIPPED | OUTPATIENT
Start: 2021-11-22 | End: 2021-11-22 | Stop reason: SDUPTHER

## 2022-02-08 DIAGNOSIS — F41.9 ANXIETY: ICD-10-CM

## 2022-02-08 RX ORDER — ESCITALOPRAM OXALATE 5 MG/1
TABLET ORAL
Qty: 90 TABLET | Refills: 1 | Status: SHIPPED | OUTPATIENT
Start: 2022-02-08 | End: 2022-08-02

## 2022-03-18 PROBLEM — R93.1 AGATSTON CORONARY ARTERY CALCIUM SCORE LESS THAN 100: Status: ACTIVE | Noted: 2021-04-01

## 2022-03-19 PROBLEM — F90.2 ATTENTION DEFICIT HYPERACTIVITY DISORDER (ADHD), COMBINED TYPE: Status: ACTIVE | Noted: 2018-06-20

## 2022-05-06 RX ORDER — MONTELUKAST SODIUM 10 MG/1
TABLET ORAL
Qty: 90 TABLET | Refills: 3 | Status: SHIPPED | OUTPATIENT
Start: 2022-05-06

## 2022-08-02 DIAGNOSIS — F41.9 ANXIETY: ICD-10-CM

## 2022-08-02 RX ORDER — ESCITALOPRAM OXALATE 5 MG/1
TABLET ORAL
Qty: 90 TABLET | Refills: 1 | Status: SHIPPED | OUTPATIENT
Start: 2022-08-02

## 2023-01-25 DIAGNOSIS — F41.9 ANXIETY: ICD-10-CM

## 2023-01-25 RX ORDER — ESCITALOPRAM OXALATE 5 MG/1
TABLET ORAL
Qty: 90 TABLET | Refills: 1 | Status: SHIPPED | OUTPATIENT
Start: 2023-01-25

## 2023-05-30 RX ORDER — IBUPROFEN 200 MG
600 TABLET ORAL EVERY 8 HOURS PRN
COMMUNITY
Start: 2020-02-14

## 2023-05-30 RX ORDER — DIPHENHYDRAMINE HCL 25 MG
25 CAPSULE ORAL EVERY 6 HOURS PRN
COMMUNITY

## 2023-05-30 RX ORDER — ASPIRIN 81 MG/1
81 TABLET, CHEWABLE ORAL
COMMUNITY

## 2023-05-30 RX ORDER — ETONOGESTREL AND ETHINYL ESTRADIOL 11.7; 2.7 MG/1; MG/1
INSERT, EXTENDED RELEASE VAGINAL
COMMUNITY
Start: 2021-11-08

## 2023-05-30 RX ORDER — CETIRIZINE HYDROCHLORIDE 10 MG/1
10 TABLET ORAL DAILY
COMMUNITY

## 2023-05-30 RX ORDER — ALBUTEROL SULFATE 90 UG/1
1 AEROSOL, METERED RESPIRATORY (INHALATION) EVERY 6 HOURS PRN
COMMUNITY
Start: 2021-10-31

## 2023-05-30 RX ORDER — ESCITALOPRAM OXALATE 5 MG/1
1 TABLET ORAL DAILY
COMMUNITY
Start: 2023-01-25

## 2023-05-30 RX ORDER — FLUTICASONE FUROATE AND VILANTEROL 200; 25 UG/1; UG/1
POWDER RESPIRATORY (INHALATION)
COMMUNITY
Start: 2021-04-27

## 2023-05-30 RX ORDER — METHIMAZOLE 5 MG/1
TABLET ORAL
COMMUNITY
Start: 2021-10-11

## 2023-05-30 RX ORDER — DEXTROAMPHETAMINE SACCHARATE, AMPHETAMINE ASPARTATE MONOHYDRATE, DEXTROAMPHETAMINE SULFATE AND AMPHETAMINE SULFATE 2.5; 2.5; 2.5; 2.5 MG/1; MG/1; MG/1; MG/1
10 CAPSULE, EXTENDED RELEASE ORAL
COMMUNITY
Start: 2021-12-05

## 2023-05-30 RX ORDER — MONTELUKAST SODIUM 10 MG/1
1 TABLET ORAL DAILY
COMMUNITY
Start: 2022-05-06